# Patient Record
Sex: FEMALE | Race: ASIAN | NOT HISPANIC OR LATINO | Employment: FULL TIME | URBAN - METROPOLITAN AREA
[De-identification: names, ages, dates, MRNs, and addresses within clinical notes are randomized per-mention and may not be internally consistent; named-entity substitution may affect disease eponyms.]

---

## 2017-01-05 ENCOUNTER — OFFICE VISIT (OUTPATIENT)
Dept: PODIATRY | Facility: CLINIC | Age: 32
End: 2017-01-05
Payer: COMMERCIAL

## 2017-01-05 VITALS
HEART RATE: 76 BPM | HEIGHT: 64 IN | BODY MASS INDEX: 22.82 KG/M2 | WEIGHT: 133.69 LBS | SYSTOLIC BLOOD PRESSURE: 110 MMHG | DIASTOLIC BLOOD PRESSURE: 71 MMHG

## 2017-01-05 DIAGNOSIS — M20.10 HALLUX ABDUCTO VALGUS, UNSPECIFIED LATERALITY: ICD-10-CM

## 2017-01-05 DIAGNOSIS — M79.672 FOOT PAIN, LEFT: Primary | ICD-10-CM

## 2017-01-05 PROCEDURE — 99999 PR PBB SHADOW E&M-EST. PATIENT-LVL III: CPT | Mod: PBBFAC,,, | Performed by: PODIATRIST

## 2017-01-05 PROCEDURE — 99204 OFFICE O/P NEW MOD 45 MIN: CPT | Mod: S$GLB,,, | Performed by: PODIATRIST

## 2017-01-05 RX ORDER — DICLOFENAC SODIUM 10 MG/G
2 GEL TOPICAL 4 TIMES DAILY
Qty: 1 TUBE | Refills: 5 | Status: SHIPPED | OUTPATIENT
Start: 2017-01-05 | End: 2018-02-22 | Stop reason: CLARIF

## 2017-01-05 NOTE — MR AVS SNAPSHOT
"    [facility]iat[facility]  [address]  [place] [address]  Phone: [phone]                  Sveta Saez   2017 8:00 AM   Office Visit    Description:  Female : 1985   Provider:  Henry Jerez DPM   Department:  [facility]           Reason for Visit     Foot Problem     Foot Pain     Bunions           Diagnoses this Visit        Comments    Foot pain, left    -  Primary     Hallux abducto valgus, unspecified laterality                To Do List           Goals (5 Years of Data)     None      Follow-Up and Disposition     Return if symptoms worsen or fail to improve.      [facility]     [facility] Nurse Care Line -  Assistance  Registered nurses in the [facility] provide clinical advisement, health education, appointment booking, and other advisory services.  Call for this free service at [phone].             Medications                Verify that the below list of medications is an accurate representation of the medications you are currently taking.  If none reported, the list may be blank. If incorrect, please contact your healthcare provider. Carry this list with you in case of emergency.           Current Medications     butalbital-aspirin-caffeine (BUTALBITAL COMPOUND) -40 mg Tab Take 40 mg by mouth.    levocetirizine (XYZAL) 5 MG tablet Take 5 mg by mouth every evening.    meclizine (ANTIVERT) 32 MG tablet Take 25 mg by mouth 3 (three) times daily as needed.           Clinical Reference Information           Vital Signs - Last Recorded  Most recent update: 2017  8:20 AM by Shilpa Christine MA    BP Pulse Ht Wt BMI    110/71 76 5' 4" (1.626 m) 60.6 kg (133 lb 11.2 oz) 22.95 kg/m2      Blood Pressure          Most Recent Value    BP  110/71      Allergies as of 2017     No Known Allergies      Immunizations Administered on Date of Encounter - 2017     None      Instructions    Recommend wearing shoes with a wider toe box " during physical activity.    May apply a topical nsaid to the joint prior to and following exercise.    Recommend wearing a bunion shield to reduce pressure to the Lt. Bunion.    May ice the joint following activity as well to help with pain.

## 2017-01-05 NOTE — LETTER
January 5, 2017      Lee Akers MD  [address]  [place] [address]           [facility]  [address]  [place] [address]  Phone: [phone]          Patient: Sveta Saez   MR Number: [id number]   YOB: 1985   Date of Visit: 1/5/2017       Dear Dr. Lee Akers:    Thank you for referring Sveta Saez to me for evaluation. Attached you will find relevant portions of my assessment and plan of care.    If you have questions, please do not hesitate to call me. I look forward to following Sveta Saez along with you.    Sincerely,    Henry Jerez, AGGIE    Enclosure  CC:  No Recipients    If you would like to receive this communication electronically, please contact [email or web address] or [phone] to request more information on [employer] Link access.    For providers and/or their staff who would like to refer a patient to [facility], please contact us through our one-stop-shop provider referral line, [facility], at [phone].    If you feel you have received this communication in error or would no longer like to receive these types of communications, please e-mail [email or web address]

## 2017-01-05 NOTE — PATIENT INSTRUCTIONS
Recommend wearing shoes with a wider toe box during physical activity.    May apply a topical nsaid to the joint prior to and following exercise.    Recommend wearing a bunion shield to reduce pressure to the Lt. Bunion.    May ice the joint following activity as well to help with pain.

## 2017-01-05 NOTE — PROGRESS NOTES
Subjective:      Patient ID: Sveta Saez is a 31 y.o. female.    Chief Complaint: Foot Problem (PCP Dr. Akers 12/20/16); Foot Pain; and Bunions (left ft./ )  Patient presents to clinic with the complaint of pain in the Lt. Foot for the past several months.  Describes pain as aching and currently rates as a 6/10.  States symptoms are localized to the great toe joint.  Symptoms are usually exacerbated with running and other physical activity.  Symptoms are alleviated with rest and nonexistent with normal weightbearing.  Has not attmepted to self treat.  Denies any additional pedal complaints.        Past Medical History   Diagnosis Date    Abnormal Pap smear of cervix 2010       No past surgical history on file.    Family History   Problem Relation Age of Onset    Cancer Maternal Uncle      breast    Breast cancer Paternal Aunt     Cancer Paternal Uncle      breast    Thyroid disease Mother        Social History     Social History    Marital status: Single     Spouse name: N/A    Number of children: 0    Years of education: N/A     Occupational History    [facility]     prior      Social History Main Topics    Smoking status: Never Smoker    Smokeless tobacco: None    Alcohol use Yes    Drug use: No    Sexual activity: Yes     Partners: Male     Other Topics Concern    None     Social History Narrative       Current Outpatient Prescriptions   Medication Sig Dispense Refill    butalbital-aspirin-caffeine (BUTALBITAL COMPOUND) -40 mg Tab Take 40 mg by mouth.      levocetirizine (XYZAL) 5 MG tablet Take 5 mg by mouth every evening.      meclizine (ANTIVERT) 32 MG tablet Take 25 mg by mouth 3 (three) times daily as needed.      diclofenac sodium (VOLTAREN) 1 % Gel Apply 2 g topically 4 (four) times daily. 1 Tube 5     No current facility-administered medications for this visit.        Review of patient's allergies indicates:  No Known Allergies    Review of Systems   Constitution:  Negative for chills and fever.   Cardiovascular: Negative for claudication and leg swelling.   Skin: Negative for color change and nail changes.   Musculoskeletal: Positive for joint pain. Negative for arthritis and joint swelling.   Neurological: Negative for numbness and paresthesias.   Psychiatric/Behavioral: Negative for altered mental status.           Objective:      Physical Exam   Constitutional: She is oriented to person, place, and time. She appears well-developed and well-nourished. No distress.   Cardiovascular:   Pulses:       Dorsalis pedis pulses are 2+ on the right side, and 2+ on the left side.        Posterior tibial pulses are 2+ on the right side, and 2+ on the left side.   CFT <3 seconds bilateral.  Pedal hair growth present bilateral.   No varicosities noted bilateral.  No bilateral lower extremity edema.   Musculoskeletal: She exhibits no edema or tenderness.   Muscle strength 5/5 in all muscle groups bilateral.  No tenderness nor crepitation with ROM of foot/ankle joints bilateral.  No tenderness with palpation of bilateral foot and ankle.  Bilateral pes planus foot type.  Bilateral hallux abducto valgus.  Neither bunion appears to be trackbound.  No pain with palpation of bilateral medial eminence.      Neurological: She is alert and oriented to person, place, and time. She has normal strength. No sensory deficit.   Light touch intact bilateral.     Skin: Skin is warm, dry and intact. No abrasion, no bruising, no burn, no ecchymosis, no laceration, no lesion, no petechiae and no rash noted. She is not diaphoretic. No cyanosis or erythema. No pallor. Nails show no clubbing.   Pedal skin has normal turgor, temperature, and texture bilateral.  Toenails x 10 appear normotrophic. Examination of the skin reveals no evidence of significant maceration, rashes, open lesions, suspicious appearing nevi or other concerning lesions.              Assessment:       Encounter Diagnoses   Name Primary?     Foot pain, left Yes    Hallux abducto valgus, unspecified laterality          Plan:       Sveta was seen today for foot problem, foot pain and bunions.    Diagnoses and all orders for this visit:    Foot pain, left    Hallux abducto valgus, unspecified laterality      I counseled the patient on her conditions, their implications and medical management.    Recommend wearing shoes with a wider toe box during physical activity.    Recommend wearing a bunion shield to reduce pressure to the Lt. Bunion.    May ice the joint following activity to help with pain.    Prescription written for voltaren gel to be applied to the affected joint.     RTC prn.     Henry Jerez DPM

## 2017-01-23 ENCOUNTER — OFFICE VISIT (OUTPATIENT)
Dept: ORTHOPEDICS | Facility: CLINIC | Age: 32
End: 2017-01-23
Payer: COMMERCIAL

## 2017-01-23 ENCOUNTER — HOSPITAL ENCOUNTER (OUTPATIENT)
Dept: RADIOLOGY | Facility: HOSPITAL | Age: 32
Discharge: HOME | End: 2017-01-23
Attending: ORTHOPAEDIC SURGERY
Payer: COMMERCIAL

## 2017-01-23 VITALS
SYSTOLIC BLOOD PRESSURE: 125 MMHG | DIASTOLIC BLOOD PRESSURE: 84 MMHG | HEIGHT: 64 IN | BODY MASS INDEX: 22.1 KG/M2 | WEIGHT: 129.44 LBS | HEART RATE: 76 BPM

## 2017-01-23 DIAGNOSIS — M20.12 HALLUX VALGUS, LEFT: ICD-10-CM

## 2017-01-23 DIAGNOSIS — R52 PAIN: ICD-10-CM

## 2017-01-23 DIAGNOSIS — M77.42 METATARSALGIA OF LEFT FOOT: ICD-10-CM

## 2017-01-23 DIAGNOSIS — R52 PAIN: Primary | ICD-10-CM

## 2017-01-23 PROCEDURE — 99203 OFFICE O/P NEW LOW 30 MIN: CPT | Mod: S$GLB,,, | Performed by: ORTHOPAEDIC SURGERY

## 2017-01-23 PROCEDURE — 99999 PR PBB SHADOW E&M-EST. PATIENT-LVL III: CPT | Mod: PBBFAC,,, | Performed by: ORTHOPAEDIC SURGERY

## 2017-01-23 PROCEDURE — 73630 X-RAY EXAM OF FOOT: CPT | Mod: 26,LT,, | Performed by: RADIOLOGY

## 2017-01-23 PROCEDURE — 73630 X-RAY EXAM OF FOOT: CPT | Mod: TC,LT

## 2017-01-23 NOTE — PROGRESS NOTES
DATE: 1/23/2017  PATIENT: Sveta Saez    CHIEF COMPLAINT: left foot pain    HISTORY:  Sveta Saez is a 31 y.o. female here for initial evaluation of left foot pain.  She was at a wedding in 5/2016 and someone stepped on her left foot (she was wearing sandals).  She initially had bruising, which has resolved, and now continues to have pain.  The pain is in the first web space and worse with weight bearing/activities; it has caused her to stop running.  Pain is described as a burning.  She does have bilateral hallux valgus, but she has no pain along the medial 1st MTP joint.        PAST MEDICAL/SURGICAL HISTORY:  Past Medical History   Diagnosis Date    Abnormal Pap smear of cervix 2010     History reviewed. No pertinent past surgical history.    Current Medications:   Current Outpatient Prescriptions:     butalbital-aspirin-caffeine (BUTALBITAL COMPOUND) -40 mg Tab, Take 40 mg by mouth., Disp: , Rfl:     levocetirizine (XYZAL) 5 MG tablet, Take 5 mg by mouth every evening., Disp: , Rfl:     meclizine (ANTIVERT) 32 MG tablet, Take 25 mg by mouth 3 (three) times daily as needed., Disp: , Rfl:     diclofenac sodium (VOLTAREN) 1 % Gel, Apply 2 g topically 4 (four) times daily., Disp: 1 Tube, Rfl: 5    Social History:   Social History     Social History    Marital status: Single     Spouse name: N/A    Number of children: 0    Years of education: N/A     Occupational History    [facility]     prior      Social History Main Topics    Smoking status: Never Smoker    Smokeless tobacco: Not on file    Alcohol use Yes    Drug use: No    Sexual activity: Yes     Partners: Male     Other Topics Concern    Not on file     Social History Narrative       REVIEW OF SYSTEMS:  Constitution: Negative. Negative for chills, fever and night sweats.   Cardiovascular: Negative for chest pain and syncope.   Respiratory: Negative for cough and shortness of breath.   Gastrointestinal: See HPI. Negative for  "nausea/vomiting. Negative for abdominal pain.  Genitourinary: See HPI. Negative for discoloration or dysuria.  Skin: Negative for dry skin, itching and rash.   Hematologic/Lymphatic: Negative for bleeding problem. Does not bruise/bleed easily.   Musculoskeletal: Negative for falls and muscle weakness.   Neurological: See HPI. No seizures.   Endocrine: Negative for polydipsia, polyphagia and polyuria.   Allergic/Immunologic: Negative for hives and persistent infections.    PHYSICAL EXAMINATION:    Visit Vitals    /84    Pulse 76    Ht 5' 4" (1.626 m)    Wt 58.7 kg (129 lb 6.6 oz)    BMI 22.21 kg/m2       General: The patient is a 31 y.o. female in no apparent distress, the patient is orientatied to person, place and time.   Psych: Normal mood and affect  HEENT:  NCAT, sclera nonicteric  Lungs:  Respirations are equal and unlabored.  CV:  2+ bilateral upper and lower extremity pulses.  Skin:  Intact throughout.  Musculoskeletal: No pain with the range of motion of the bilateral hips. No trochanteric tenderness to palpation. No pain with range of motion about the bilateral knees.    Left Foot  - hallux valgus  - ttp to 1st web space  - no ttp over MTP joints or phalanges  - positive Jessi's sign to 1st web space  - NVI          IMAGING:     Radiographs of the left foot were ordered and personally reviewed with the patient today.   - congruent hallux valgus with increased LINDA    ASSESSMENT/PLAN:    Sveta was seen today for pain.    Diagnoses and all orders for this visit:    Pain, left foot  -     X-Ray Foot Complete Left; Future  -     MRI Lower Extremity Without Contrast Left; Future    Hallux valgus, left  -     MRI Lower Extremity Without Contrast Left; Future    Metatarsalgia of left foot  -     MRI Lower Extremity Without Contrast Left; Future      No Follow-up on file.    Possible neuroma, though this location of 1st web space is less common.  Will obtain MRI of left forefoot to evaluate for neuroma " vs other etiology of pain (such as cyst).  She will return to clinic after MRI to review results, if nothing is seen on MRI then we will consider a diagnostic injection.    I have personally taken the history and examined this patient and agree with the residents note as stated above.

## 2017-01-28 ENCOUNTER — HOSPITAL ENCOUNTER (OUTPATIENT)
Dept: RADIOLOGY | Facility: HOSPITAL | Age: 32
Discharge: HOME | End: 2017-01-28
Attending: ORTHOPAEDIC SURGERY
Payer: COMMERCIAL

## 2017-01-28 DIAGNOSIS — M77.42 METATARSALGIA OF LEFT FOOT: ICD-10-CM

## 2017-01-28 DIAGNOSIS — M20.12 HALLUX VALGUS, LEFT: ICD-10-CM

## 2017-01-28 DIAGNOSIS — R52 PAIN: ICD-10-CM

## 2017-01-28 PROCEDURE — 73718 MRI LOWER EXTREMITY W/O DYE: CPT | Mod: 26,LT,, | Performed by: RADIOLOGY

## 2017-01-28 PROCEDURE — 73718 MRI LOWER EXTREMITY W/O DYE: CPT | Mod: TC,LT

## 2017-02-20 ENCOUNTER — OFFICE VISIT (OUTPATIENT)
Dept: ORTHOPEDICS | Facility: CLINIC | Age: 32
End: 2017-02-20
Payer: COMMERCIAL

## 2017-02-20 VITALS
HEART RATE: 79 BPM | WEIGHT: 132.19 LBS | SYSTOLIC BLOOD PRESSURE: 109 MMHG | HEIGHT: 64 IN | BODY MASS INDEX: 22.57 KG/M2 | DIASTOLIC BLOOD PRESSURE: 71 MMHG

## 2017-02-20 DIAGNOSIS — M77.42 METATARSALGIA OF LEFT FOOT: ICD-10-CM

## 2017-02-20 DIAGNOSIS — R52 PAIN: ICD-10-CM

## 2017-02-20 DIAGNOSIS — M20.12 HALLUX VALGUS, LEFT: Primary | ICD-10-CM

## 2017-02-20 PROCEDURE — 99213 OFFICE O/P EST LOW 20 MIN: CPT | Mod: S$GLB,,, | Performed by: ORTHOPAEDIC SURGERY

## 2017-02-20 PROCEDURE — 99999 PR PBB SHADOW E&M-EST. PATIENT-LVL III: CPT | Mod: PBBFAC,,, | Performed by: ORTHOPAEDIC SURGERY

## 2017-02-20 NOTE — PROGRESS NOTES
Ms. Saez returns today with results of her MRI.  This is a 31-year-old female   who has a history of hallux valgus and sustained some trauma to her left foot   last May.  She states she had been stepped on by the sole of a male's shoe on   the dorsum of her foot in the first intermetatarsal space.  Since that time, she   has had pain with weightbearing activities as well as neurogenic type pain at   night.  When I examined her 10 days ago, she was noted to have the hallux valgus   deformity without any pain on range of motion of the big toe.  Her tenderness   was in the first intermetatarsal space.  Because of the chronicity of her   symptoms and history of trauma, I went ahead and ordered an MRI.  The MRI does   not show any evidence of any mass lesions.  There is some edema within the first   MTP joint around the lateral sesamoid bone.  There is no signal edema within   the bone that would suggest a fracture.  I suppose this could have something to   do with her symptoms, but as mentioned, she had hallux valgus prior to her   injury.  So at this point, I talked about possibly doing a diagnostic injection,   but she in fact might be considering having her hallux valgus deformity   addressed.  She does have a significant hallux valgus deformity with incongruent   joint and an increased first intermetatarsal angle.  Her symptoms are such that   she avoids high impact activity.  She states she can live with the symptoms.    So at this point, I would keep an eye on her.  I am having her make a return   appointment in three months for followup.      MIGUEL/IN  dd: 02/20/2017 13:54:08 (CST)  td: 02/21/2017 07:24:55 (CST)  Doc ID   #[id number]  Job ID #[id number]    CC:     This office note has been dictated.

## 2017-04-19 ENCOUNTER — OFFICE VISIT (OUTPATIENT)
Dept: DERMATOLOGY | Facility: CLINIC | Age: 32
End: 2017-04-19
Payer: COMMERCIAL

## 2017-04-19 DIAGNOSIS — L29.9 PRURITIC CONDITION: Primary | ICD-10-CM

## 2017-04-19 PROCEDURE — 99202 OFFICE O/P NEW SF 15 MIN: CPT | Mod: S$GLB,,, | Performed by: DERMATOLOGY

## 2017-04-19 PROCEDURE — 99999 PR PBB SHADOW E&M-EST. PATIENT-LVL II: CPT | Mod: PBBFAC,,, | Performed by: DERMATOLOGY

## 2017-04-19 RX ORDER — FLUOCINONIDE 0.5 MG/G
CREAM TOPICAL 2 TIMES DAILY
Qty: 60 G | Refills: 1 | Status: SHIPPED | OUTPATIENT
Start: 2017-04-19 | End: 2017-08-17 | Stop reason: SDUPTHER

## 2017-04-19 RX ORDER — HYDROXYZINE HYDROCHLORIDE 25 MG/1
25 TABLET, FILM COATED ORAL NIGHTLY
Qty: 30 TABLET | Refills: 1 | Status: SHIPPED | OUTPATIENT
Start: 2017-04-19 | End: 2017-08-17 | Stop reason: SDUPTHER

## 2017-04-19 NOTE — PROGRESS NOTES
Subjective:       Patient ID:  Sveta Saez is a 32 y.o. female who presents for   Chief Complaint   Patient presents with    Rash     back, x 6 months, itchy & bleeds w/trauma, OTC lotion & hydrocortisone     HPI  33 yo F presents for evaluation of a rash on her back x at least 6 months.  It is described as itchy, scratching to the point of bleeding at times.  Prior treatments include OTC lotions, hydrocortisone.  Denies personal or family h/o skin cancer    Past Medical History:   Diagnosis Date    Abnormal Pap smear of cervix 2010     Review of Systems   Skin: Positive for itching and rash. Negative for tendency to form keloidal scars.   Hematologic/Lymphatic: Does not bruise/bleed easily.        Objective:    Physical Exam   Constitutional: She appears well-developed and well-nourished.   Neurological: She is alert and oriented to person, place, and time.   Psychiatric: She has a normal mood and affect.   Skin:   Areas Examined (abnormalities noted in diagram):   Head / Face Inspection Performed  Neck Inspection Performed  Chest / Axilla Inspection Performed  Back Inspection Performed  RUE Inspected  LUE Inspection Performed              Diagram Legend     Erythematous scaling macule/papule c/w actinic keratosis       Vascular papule c/w angioma      Pigmented verrucoid papule/plaque c/w seborrheic keratosis      Yellow umbilicated papule c/w sebaceous hyperplasia      Irregularly shaped tan macule c/w lentigo     1-2 mm smooth white papules consistent with Milia      Movable subcutaneous cyst with punctum c/w epidermal inclusion cyst      Subcutaneous movable cyst c/w pilar cyst      Firm pink to brown papule c/w dermatofibroma      Pedunculated fleshy papule(s) c/w skin tag(s)      Evenly pigmented macule c/w junctional nevus     Mildly variegated pigmented, slightly irregular-bordered macule c/w mildly atypical nevus      Flesh colored to evenly pigmented papule c/w intradermal nevus       Pink pearly  papule/plaque c/w basal cell carcinoma      Erythematous hyperkeratotic cursted plaque c/w SCC      Surgical scar with no sign of skin cancer recurrence      Open and closed comedones      Inflammatory papules and pustules      Verrucoid papule consistent consistent with wart     Erythematous eczematous patches and plaques     Dystrophic onycholytic nail with subungual debris c/w onychomycosis     Umbilicated papule    Erythematous-base heme-crusted tan verrucoid plaque consistent with inflamed seborrheic keratosis     Erythematous Silvery Scaling Plaque c/w Psoriasis     See annotation      Assessment / Plan:        Pruritic condition-unsure of underlying etiology (possibly eczema) although today, her findings are more c/w macular amyloidosis  -     fluocinonide 0.05% (LIDEX) 0.05 % cream; Apply topically 2 (two) times daily. Upper back  Dispense: 60 g; Refill: 1  -     hydrOXYzine HCl (ATARAX) 25 MG tablet; Take 1 tablet (25 mg total) by mouth every evening.  Dispense: 30 tablet; Refill: 1  I discussed the side effects of topical steroids including atrophy, telangiectasias, striae.  Avoid use on the face and contact with the eyes  Discussed drowsiness as potential SE of hydroxyzine  Encouraged patient to stop scratching/rubbing as this can exacerbate the condition.  Cool compresses may help alleviate the itch sensation           Return in about 2 months (around 6/19/2017).

## 2017-06-27 ENCOUNTER — OFFICE VISIT (OUTPATIENT)
Dept: DERMATOLOGY | Facility: CLINIC | Age: 32
End: 2017-06-27
Payer: COMMERCIAL

## 2017-06-27 DIAGNOSIS — L29.9 PRURITIC CONDITION: Primary | ICD-10-CM

## 2017-06-27 PROCEDURE — 99213 OFFICE O/P EST LOW 20 MIN: CPT | Mod: S$GLB,,, | Performed by: DERMATOLOGY

## 2017-06-27 PROCEDURE — 99999 PR PBB SHADOW E&M-EST. PATIENT-LVL I: CPT | Mod: PBBFAC,,, | Performed by: DERMATOLOGY

## 2017-06-27 NOTE — PROGRESS NOTES
Subjective:       Patient ID:  Sveta Saez is a 32 y.o. female who presents for   Chief Complaint   Patient presents with    Follow-up     Rash, improving      Rash  - Follow-up  Diagnosis: macular amylodosis.  Symptom course: improving  Currently using: flucocinonide cream BID, hydroxyzine 25mg qhs.  Affected locations: back  Signs / symptoms: itching (Itching resolved with treatment. She tried to discontinue treatment and itching returned so she restarted 1 week ago. )  Severity: mild to moderate      Past Medical History:   Diagnosis Date    Abnormal Pap smear of cervix 2010     Review of Systems   Constitutional: Negative for fever, chills and fatigue.   Musculoskeletal: Negative for myalgias, joint swelling and arthralgias.   Skin: Positive for itching and rash.      Objective:    Physical Exam   Constitutional: She appears well-developed and well-nourished. No distress.   Neurological: She is alert and oriented to person, place, and time. She is not disoriented.   Psychiatric: She has a normal mood and affect.   Skin:   Areas Examined (abnormalities noted in diagram):   Head / Face Inspection Performed  Neck Inspection Performed  Chest / Axilla Inspection Performed  Back Inspection Performed              Diagram Legend     Erythematous scaling macule/papule c/w actinic keratosis       Vascular papule c/w angioma      Pigmented verrucoid papule/plaque c/w seborrheic keratosis      Yellow umbilicated papule c/w sebaceous hyperplasia      Irregularly shaped tan macule c/w lentigo     1-2 mm smooth white papules consistent with Milia      Movable subcutaneous cyst with punctum c/w epidermal inclusion cyst      Subcutaneous movable cyst c/w pilar cyst      Firm pink to brown papule c/w dermatofibroma      Pedunculated fleshy papule(s) c/w skin tag(s)      Evenly pigmented macule c/w junctional nevus     Mildly variegated pigmented, slightly irregular-bordered macule c/w mildly atypical nevus      Flesh colored  to evenly pigmented papule c/w intradermal nevus       Pink pearly papule/plaque c/w basal cell carcinoma      Erythematous hyperkeratotic cursted plaque c/w SCC      Surgical scar with no sign of skin cancer recurrence      Open and closed comedones      Inflammatory papules and pustules      Verrucoid papule consistent consistent with wart     Erythematous eczematous patches and plaques     Dystrophic onycholytic nail with subungual debris c/w onychomycosis     Umbilicated papule    Erythematous-base heme-crusted tan verrucoid plaque consistent with inflamed seborrheic keratosis     Erythematous Silvery Scaling Plaque c/w Psoriasis     See annotation      Assessment / Plan:      Macular cutaneous amyloidosis: upper back. Pruritus improves with topical Fluocinonide cream BID and hydroxyzine qhs.   - Continue fluocinonide cream BID prn flare  - Continue hydroxyzine at night. If 25mg tablet makes her too drowsy, recommend she cut in half. May also try Zyrtec in the morning.     RTC as needed

## 2017-08-17 DIAGNOSIS — L29.9 PRURITIC CONDITION: ICD-10-CM

## 2017-08-18 RX ORDER — FLUOCINONIDE 0.5 MG/G
CREAM TOPICAL 2 TIMES DAILY
Qty: 60 G | Refills: 0 | Status: SHIPPED | OUTPATIENT
Start: 2017-08-18 | End: 2018-02-22 | Stop reason: CLARIF

## 2017-08-18 RX ORDER — HYDROXYZINE HYDROCHLORIDE 25 MG/1
25 TABLET, FILM COATED ORAL NIGHTLY
Qty: 30 TABLET | Refills: 1 | Status: SHIPPED | OUTPATIENT
Start: 2017-08-18 | End: 2018-02-22 | Stop reason: CLARIF

## 2017-11-09 ENCOUNTER — PATIENT MESSAGE (OUTPATIENT)
Dept: OBSTETRICS AND GYNECOLOGY | Facility: CLINIC | Age: 32
End: 2017-11-09

## 2017-11-20 ENCOUNTER — OFFICE VISIT (OUTPATIENT)
Dept: OBSTETRICS AND GYNECOLOGY | Facility: CLINIC | Age: 32
End: 2017-11-20
Attending: OBSTETRICS & GYNECOLOGY
Payer: COMMERCIAL

## 2017-11-20 VITALS
DIASTOLIC BLOOD PRESSURE: 70 MMHG | SYSTOLIC BLOOD PRESSURE: 130 MMHG | WEIGHT: 136 LBS | BODY MASS INDEX: 24.1 KG/M2 | HEIGHT: 63 IN

## 2017-11-20 DIAGNOSIS — Z80.3 FAMILY HISTORY OF BREAST CANCER: ICD-10-CM

## 2017-11-20 DIAGNOSIS — Z01.419 WELL FEMALE EXAM WITH ROUTINE GYNECOLOGICAL EXAM: Primary | ICD-10-CM

## 2017-11-20 PROCEDURE — 99999 PR PBB SHADOW E&M-EST. PATIENT-LVL III: CPT | Mod: PBBFAC,,, | Performed by: OBSTETRICS & GYNECOLOGY

## 2017-11-20 PROCEDURE — 99395 PREV VISIT EST AGE 18-39: CPT | Mod: S$GLB,,, | Performed by: OBSTETRICS & GYNECOLOGY

## 2017-11-20 NOTE — PROGRESS NOTES
SUBJECTIVE:   32 y.o. female   for routine gyn exam. Patient's last menstrual period was 2017 (approximate)..  She has no unusual complaints.          Past Medical History:   Diagnosis Date    Abnormal Pap smear of cervix      Past Surgical History:   Procedure Laterality Date    NO PAST SURGERIES       Social History     Social History    Marital status: Single     Spouse name: N/A    Number of children: 0    Years of education: N/A     Occupational History    [employer]     prior      Social History Main Topics    Smoking status: Never Smoker    Smokeless tobacco: Never Used    Alcohol use Yes      Comment: social    Drug use: No    Sexual activity: Yes     Partners: Male     Birth control/ protection: None     Other Topics Concern    Not on file     Social History Narrative    No narrative on file     Family History   Problem Relation Age of Onset    Breast cancer Maternal Uncle     Breast cancer Paternal Aunt     Cancer Paternal Uncle     Thyroid disease Mother     Breast cancer Maternal Aunt 82    Melanoma Neg Hx     Psoriasis Neg Hx     Lupus Neg Hx     Colon cancer Neg Hx     Ovarian cancer Neg Hx      OB History    Para Term  AB Living   0 0 0 0 0 0   SAB TAB Ectopic Multiple Live Births   0 0 0 0                 Current Outpatient Prescriptions   Medication Sig Dispense Refill    butalbital-aspirin-caffeine (BUTALBITAL COMPOUND) -40 mg Tab Take 40 mg by mouth.      fluocinonide 0.05% (LIDEX) 0.05 % cream Apply topically 2 (two) times daily. Upper back 60 g 0    hydrOXYzine HCl (ATARAX) 25 MG tablet Take 1 tablet (25 mg total) by mouth every evening. 30 tablet 1    levocetirizine (XYZAL) 5 MG tablet Take 5 mg by mouth every evening.      meclizine (ANTIVERT) 32 MG tablet Take 25 mg by mouth 3 (three) times daily as needed.      diclofenac sodium (VOLTAREN) 1 % Gel Apply 2 g topically 4 (four) times daily. 1 Tube 5     No current  "facility-administered medications for this visit.      Allergies: Patient has no known allergies.     ROS:  Constitutional: no weight loss, weight gain, fever, fatigue  Eyes:  No vision changes, glasses/contacts  ENT/Mouth: No ulcers, sinus problems, ears ringing, headache  Cardiovascular: No inability to lie flat, chest pain, exercise intolerance, swelling, heart palpitations  Respiratory: No wheezing, coughing blood, shortness of breath, or cough  Gastrointestinal: No diarrhea, bloody stool, nausea/vomiting, constipation, gas, hemorrhoids  Genitourinary: No blood in urine, painful urination, urgency of urination, frequency of urination, incomplete emptying, incontinence, abnormal bleeding, painful periods, heavy periods, vaginal discharge, vaginal odor, painful intercourse, sexual problems, bleeding after intercourse.  Musculoskeletal: No muscle weakness  Skin/Breast: No painful breasts, nipple discharge, masses, rash, ulcers  Neurological: No passing out, seizures, numbness, headache  Endocrine: No diabetes, hypothyroid, hyperthyroid, hot flashes, hair loss, abnormal hair growth, ance  Psychiatric: No depression, crying  Hematologic: No bruises, bleeding, swollen lymph nodes, anemia.      OBJECTIVE:   The patient appears well, alert, oriented x 3, in no distress.  /70 (BP Location: Left arm, Patient Position: Sitting, BP Method: Medium (Manual))   Ht 5' 3" (1.6 m)   Wt 61.7 kg (136 lb 0.4 oz)   LMP 11/16/2017 (Approximate)   BMI 24.10 kg/m²   NECK: no thyromegaly, trachea midline  SKIN: no acne, striae, hirsutism  CHEST: CTAB  CV: RRR  BREAST EXAM: breasts appear normal, no suspicious masses, no skin or nipple changes or axillary nodes  ABDOMEN: no hernias, masses, or hepatosplenomegaly  GENITALIA: normal external genitalia, no erythema, no discharge  URETHRA: normal urethra, normal urethral meatus  VAGINA: Normal  CERVIX: no lesions or cervical motion tenderness  UTERUS: normal size, contour, position, " consistency, mobility, non-tender  ADNEXA: no mass, fullness, tenderness      ASSESSMENT:   1. Well female exam with routine gynecological exam     2. Family history of breast cancer         PLAN:       Discussed FH breast CA and BRCA gene mut testing for mother and uncle  Return to clinic in 1 year

## 2017-12-01 ENCOUNTER — HOSPITAL ENCOUNTER (OUTPATIENT)
Dept: RADIOLOGY | Facility: HOSPITAL | Age: 32
Discharge: HOME | End: 2017-12-01
Attending: FAMILY MEDICINE
Payer: COMMERCIAL

## 2017-12-01 ENCOUNTER — OFFICE VISIT (OUTPATIENT)
Dept: INTERNAL MEDICINE | Facility: CLINIC | Age: 32
End: 2017-12-01
Attending: FAMILY MEDICINE
Payer: COMMERCIAL

## 2017-12-01 VITALS
BODY MASS INDEX: 22.94 KG/M2 | DIASTOLIC BLOOD PRESSURE: 78 MMHG | HEIGHT: 64 IN | SYSTOLIC BLOOD PRESSURE: 123 MMHG | WEIGHT: 134.38 LBS

## 2017-12-01 DIAGNOSIS — M25.552 PAIN OF LEFT HIP JOINT: ICD-10-CM

## 2017-12-01 DIAGNOSIS — Z00.00 ANNUAL PHYSICAL EXAM: Primary | ICD-10-CM

## 2017-12-01 PROCEDURE — 73521 X-RAY EXAM HIPS BI 2 VIEWS: CPT | Mod: 26,,, | Performed by: RADIOLOGY

## 2017-12-01 PROCEDURE — 73521 X-RAY EXAM HIPS BI 2 VIEWS: CPT | Mod: TC

## 2017-12-01 PROCEDURE — 99395 PREV VISIT EST AGE 18-39: CPT | Mod: S$GLB,,, | Performed by: FAMILY MEDICINE

## 2017-12-01 PROCEDURE — 99999 PR PBB SHADOW E&M-EST. PATIENT-LVL III: CPT | Mod: PBBFAC,,, | Performed by: FAMILY MEDICINE

## 2017-12-01 NOTE — PROGRESS NOTES
Subjective:       Patient ID: Sveta Saez is a 32 y.o. female.    Chief Complaint: Annual Exam    Established patient for an annual wellness check/physical exam. No specific complaints, please see ROS.        Review of Systems   Constitutional: Negative for activity change, chills, fatigue, fever and unexpected weight change.   HENT: Negative for congestion, hearing loss, rhinorrhea and trouble swallowing.    Eyes: Negative for discharge, redness and visual disturbance.   Respiratory: Negative for cough, chest tightness, shortness of breath and wheezing.    Cardiovascular: Negative for chest pain, palpitations and leg swelling.   Gastrointestinal: Negative for abdominal pain, blood in stool, constipation, diarrhea and vomiting.   Endocrine: Negative for polydipsia and polyuria.   Genitourinary: Negative for difficulty urinating, dysuria, hematuria and menstrual problem.   Musculoskeletal: Positive for arthralgias. Negative for back pain, gait problem, joint swelling, myalgias and neck pain.   Skin: Negative for color change and rash.   Neurological: Negative for tremors, speech difficulty, weakness, numbness and headaches.   Hematological: Negative for adenopathy. Does not bruise/bleed easily.   Psychiatric/Behavioral: Negative for behavioral problems, confusion, dysphoric mood and sleep disturbance. The patient is not nervous/anxious.        Objective:      Physical Exam   Constitutional: She is oriented to person, place, and time. She appears well-developed and well-nourished. No distress.   Eyes: No scleral icterus.   Neck: Normal range of motion. Neck supple. No JVD present. No tracheal deviation present. No thyromegaly present.   Cardiovascular: Normal rate, normal heart sounds and intact distal pulses.  Exam reveals no gallop and no friction rub.    No murmur heard.  Pulmonary/Chest: Effort normal and breath sounds normal. No respiratory distress. She has no wheezes. She has no rales.   Abdominal: Soft. Bowel  sounds are normal. She exhibits no distension, no abdominal bruit and no mass. There is no tenderness. There is no rebound and no guarding.   Musculoskeletal: She exhibits no edema.        Right hip: She exhibits normal range of motion, normal strength and no tenderness.        Left hip: She exhibits tenderness and crepitus. She exhibits normal range of motion and normal strength.        Right lower leg: She exhibits no edema.        Left lower leg: She exhibits no edema.   Lymphadenopathy:     She has no cervical adenopathy.   Neurological: She is alert and oriented to person, place, and time. She displays no tremor. No cranial nerve deficit. Coordination and gait normal.   Skin: Skin is warm and dry. No rash noted. She is not diaphoretic. No erythema.   Psychiatric: She has a normal mood and affect. Her behavior is normal. Judgment and thought content normal.   Nursing note and vitals reviewed.      Assessment:       1. Annual physical exam    2. Pain of left hip joint        Plan:   Sveta was seen today for annual exam.    Diagnoses and all orders for this visit:    Annual physical exam  -     Basic metabolic panel; Future  -     Lipid panel; Future  -     TSH; Future    Pain of left hip joint  -     X-Ray Hips Bilateral 2 View Inc AP Pelvis; Future  -     X-Ray Arthrogram Hip Left with MRI to follow; Future  -     MRI Lower Ext Joint With Contrast Left; Future      See meds, orders, follow up, routing and instructions sections of encounter.  A  32-year-old established female patient for annual physical examination.  No   complaints.    The patient presents with left hip popping, not particularly painful.  We noted   this last year.  I asked her to watch it.  It has continued without change.  She   does note this more with extension.    On physical examination with internal rotation of the left hip, we did notice a   clunk.  This was slightly painful, a little bit uncharacteristic from her   typical  symptoms.    Hip x-rays were within normal limits.    RECOMMENDATION:  I suggested an MRI arthrogram of the left hip to rule out a   labral tear.  Her symptoms have not improved over the course of a year and a   have interfered with her confidence with exercising.  Further recommendations to   follow.      TIM/MANUEL  dd: 12/01/2017 18:19:16 (CST)  td: 12/02/2017 01:24:02 (CST)  Doc ID   #[id number]  Job ID #[id number]    CC:

## 2017-12-04 ENCOUNTER — LAB VISIT (OUTPATIENT)
Dept: LAB | Facility: HOSPITAL | Age: 32
End: 2017-12-04
Attending: FAMILY MEDICINE
Payer: COMMERCIAL

## 2017-12-04 DIAGNOSIS — Z00.00 ANNUAL PHYSICAL EXAM: ICD-10-CM

## 2017-12-04 LAB
ANION GAP SERPL CALC-SCNC: 6 MMOL/L
BUN SERPL-MCNC: 12 MG/DL
CALCIUM SERPL-MCNC: 8.5 MG/DL
CHLORIDE SERPL-SCNC: 108 MMOL/L
CHOLEST SERPL-MCNC: 166 MG/DL
CHOLEST/HDLC SERPL: 2.4 {RATIO}
CO2 SERPL-SCNC: 26 MMOL/L
CREAT SERPL-MCNC: 0.8 MG/DL
EST. GFR  (AFRICAN AMERICAN): >60 ML/MIN/1.73 M^2
EST. GFR  (NON AFRICAN AMERICAN): >60 ML/MIN/1.73 M^2
GLUCOSE SERPL-MCNC: 93 MG/DL
HDLC SERPL-MCNC: 69 MG/DL
HDLC SERPL: 41.6 %
LDLC SERPL CALC-MCNC: 85.6 MG/DL
NONHDLC SERPL-MCNC: 97 MG/DL
POTASSIUM SERPL-SCNC: 4 MMOL/L
SODIUM SERPL-SCNC: 140 MMOL/L
TRIGL SERPL-MCNC: 57 MG/DL
TSH SERPL DL<=0.005 MIU/L-ACNC: 0.87 UIU/ML

## 2017-12-04 PROCEDURE — 80048 BASIC METABOLIC PNL TOTAL CA: CPT

## 2017-12-04 PROCEDURE — 36415 COLL VENOUS BLD VENIPUNCTURE: CPT

## 2017-12-04 PROCEDURE — 80061 LIPID PANEL: CPT

## 2017-12-04 PROCEDURE — 84443 ASSAY THYROID STIM HORMONE: CPT

## 2017-12-08 ENCOUNTER — TELEPHONE (OUTPATIENT)
Dept: RADIOLOGY | Facility: HOSPITAL | Age: 32
End: 2017-12-08

## 2017-12-11 ENCOUNTER — HOSPITAL ENCOUNTER (OUTPATIENT)
Dept: RADIOLOGY | Facility: HOSPITAL | Age: 32
Discharge: HOME | End: 2017-12-11
Attending: FAMILY MEDICINE
Payer: COMMERCIAL

## 2017-12-11 DIAGNOSIS — M25.552 PAIN OF LEFT HIP JOINT: ICD-10-CM

## 2017-12-11 PROCEDURE — 73722 MRI JOINT OF LWR EXTR W/DYE: CPT | Mod: 26,LT,, | Performed by: RADIOLOGY

## 2017-12-11 PROCEDURE — 27093 INJECTION FOR HIP X-RAY: CPT | Mod: 26,,, | Performed by: RADIOLOGY

## 2017-12-11 PROCEDURE — 73525 CONTRAST X-RAY OF HIP: CPT | Mod: TC

## 2017-12-11 PROCEDURE — 73722 MRI JOINT OF LWR EXTR W/DYE: CPT | Mod: TC,LT

## 2017-12-11 PROCEDURE — 73525 CONTRAST X-RAY OF HIP: CPT | Mod: 26,,, | Performed by: RADIOLOGY

## 2017-12-11 PROCEDURE — 25500020 PHARM REV CODE 255: Performed by: FAMILY MEDICINE

## 2017-12-11 PROCEDURE — 25000003 PHARM REV CODE 250: Performed by: FAMILY MEDICINE

## 2017-12-11 PROCEDURE — 27093 INJECTION FOR HIP X-RAY: CPT | Mod: TC

## 2017-12-11 RX ORDER — BUPIVACAINE HYDROCHLORIDE 2.5 MG/ML
5 INJECTION, SOLUTION EPIDURAL; INFILTRATION; INTRACAUDAL ONCE
Status: COMPLETED | OUTPATIENT
Start: 2017-12-11 | End: 2017-12-11

## 2017-12-11 RX ORDER — GADOBUTROL 604.72 MG/ML
5 INJECTION INTRAVENOUS
Status: COMPLETED | OUTPATIENT
Start: 2017-12-11 | End: 2017-12-11

## 2017-12-11 RX ORDER — LIDOCAINE HYDROCHLORIDE 10 MG/ML
8 INJECTION, SOLUTION EPIDURAL; INFILTRATION; INTRACAUDAL; PERINEURAL ONCE
Status: COMPLETED | OUTPATIENT
Start: 2017-12-11 | End: 2017-12-11

## 2017-12-11 RX ADMIN — BUPIVACAINE HYDROCHLORIDE 12.5 MG: 2.5 INJECTION, SOLUTION EPIDURAL; INFILTRATION; INTRACAUDAL; PERINEURAL at 04:12

## 2017-12-11 RX ADMIN — IOHEXOL 6 ML: 300 INJECTION, SOLUTION INTRAVENOUS at 04:12

## 2017-12-11 RX ADMIN — LIDOCAINE HYDROCHLORIDE 80 MG: 10 INJECTION, SOLUTION EPIDURAL; INFILTRATION; INTRACAUDAL; PERINEURAL at 04:12

## 2017-12-11 RX ADMIN — GADOBUTROL 5 ML: 604.72 INJECTION INTRAVENOUS at 04:12

## 2017-12-14 ENCOUNTER — TELEPHONE (OUTPATIENT)
Dept: INTERNAL MEDICINE | Facility: CLINIC | Age: 32
End: 2017-12-14

## 2017-12-14 ENCOUNTER — PATIENT MESSAGE (OUTPATIENT)
Dept: INTERNAL MEDICINE | Facility: CLINIC | Age: 32
End: 2017-12-14

## 2017-12-14 DIAGNOSIS — S73.199A TEAR OF ACETABULAR LABRUM, UNSPECIFIED LATERALITY, INITIAL ENCOUNTER: Primary | ICD-10-CM

## 2017-12-14 NOTE — TELEPHONE ENCOUNTER
Called patient to schedule appt with Dr. Summers with [facility]. No answer. Left vm to return call.

## 2018-01-08 ENCOUNTER — HOSPITAL ENCOUNTER (OUTPATIENT)
Dept: RADIOLOGY | Facility: HOSPITAL | Age: 33
Discharge: HOME | End: 2018-01-08
Attending: ORTHOPAEDIC SURGERY
Payer: COMMERCIAL

## 2018-01-08 ENCOUNTER — OFFICE VISIT (OUTPATIENT)
Dept: SPORTS MEDICINE | Facility: CLINIC | Age: 33
End: 2018-01-08
Attending: FAMILY MEDICINE
Payer: COMMERCIAL

## 2018-01-08 VITALS
BODY MASS INDEX: 22.88 KG/M2 | WEIGHT: 134 LBS | DIASTOLIC BLOOD PRESSURE: 74 MMHG | HEART RATE: 67 BPM | SYSTOLIC BLOOD PRESSURE: 127 MMHG | HEIGHT: 64 IN

## 2018-01-08 DIAGNOSIS — S73.192A TEAR OF LEFT ACETABULAR LABRUM, INITIAL ENCOUNTER: ICD-10-CM

## 2018-01-08 DIAGNOSIS — M25.552 LEFT HIP PAIN: Primary | ICD-10-CM

## 2018-01-08 DIAGNOSIS — M25.552 LEFT HIP PAIN: ICD-10-CM

## 2018-01-08 PROCEDURE — 73503 X-RAY EXAM HIP UNI 4/> VIEWS: CPT | Mod: TC,PO,LT

## 2018-01-08 PROCEDURE — 73503 X-RAY EXAM HIP UNI 4/> VIEWS: CPT | Mod: 26,LT,, | Performed by: RADIOLOGY

## 2018-01-08 PROCEDURE — 99999 PR PBB SHADOW E&M-EST. PATIENT-LVL III: CPT | Mod: PBBFAC,,, | Performed by: ORTHOPAEDIC SURGERY

## 2018-01-08 PROCEDURE — 99204 OFFICE O/P NEW MOD 45 MIN: CPT | Mod: S$GLB,,, | Performed by: ORTHOPAEDIC SURGERY

## 2018-01-08 NOTE — PROGRESS NOTES
CC:left hip pain    HPI:   Sveta Saez is a pleasant 32 y.o. [employer] patient who reports to clinic with left hip pain. Today the patient rates pain at a 7/10 on visual analog scale.       Started without inciting event.  Has catching, clicking in left hip.       3-4 years duration, neg traumatic injury,     no instabilty.  Getting in and out of car + pain    Affecting ADLs and exercising    running activity worst    Has done 6 weeks NSAIDs, no help.    Has done exercises, no help.     Review of Systems   Constitution: Negative. Negative for chills, fever and night sweats.   HENT: Negative for congestion and headaches.    Eyes: Negative for blurred vision, left vision loss and right vision loss.   Cardiovascular: Negative for chest pain and syncope.   Respiratory: Negative for cough and shortness of breath.    Endocrine: Negative for polydipsia, polyphagia and polyuria.   Hematologic/Lymphatic: Negative for bleeding problem. Does not bruise/bleed easily.   Skin: Negative for dry skin, itching and rash.   Musculoskeletal: Negative for falls and muscle weakness.   Gastrointestinal: Negative for abdominal pain and bowel incontinence.   Genitourinary: Negative for bladder incontinence and nocturia.   Neurological: Negative for disturbances in coordination, loss of balance and seizures.   Psychiatric/Behavioral: Negative for depression. The patient does not have insomnia.    Allergic/Immunologic: Negative for hives and persistent infections.     PAST MEDICAL HISTORY:   Past Medical History:   Diagnosis Date    Abnormal Pap smear of cervix 2010     PAST SURGICAL HISTORY:   Past Surgical History:   Procedure Laterality Date    NO PAST SURGERIES       FAMILY HISTORY:   Family History   Problem Relation Age of Onset    Breast cancer Maternal Uncle     Breast cancer Paternal Aunt     Cancer Paternal Uncle     Thyroid disease Mother     Breast cancer Maternal Aunt 82    Melanoma Neg Hx     Psoriasis Neg Hx      "Lupus Neg Hx     Colon cancer Neg Hx     Ovarian cancer Neg Hx      SOCIAL HISTORY:   Social History     Social History    Marital status: Single     Spouse name: N/A    Number of children: 0    Years of education: N/A     Occupational History    [employer]     prior      Social History Main Topics    Smoking status: Never Smoker    Smokeless tobacco: Never Used    Alcohol use Yes      Comment: social    Drug use: No    Sexual activity: Yes     Partners: Male     Birth control/ protection: None     Other Topics Concern    Not on file     Social History Narrative    No narrative on file       MEDICATIONS:   Current Outpatient Prescriptions:     butalbital-aspirin-caffeine (BUTALBITAL COMPOUND) -40 mg Tab, Take 40 mg by mouth., Disp: , Rfl:     fluocinonide 0.05% (LIDEX) 0.05 % cream, Apply topically 2 (two) times daily. Upper back, Disp: 60 g, Rfl: 0    hydrOXYzine HCl (ATARAX) 25 MG tablet, Take 1 tablet (25 mg total) by mouth every evening., Disp: 30 tablet, Rfl: 1    levocetirizine (XYZAL) 5 MG tablet, Take 5 mg by mouth every evening., Disp: , Rfl:     meclizine (ANTIVERT) 32 MG tablet, Take 25 mg by mouth 3 (three) times daily as needed., Disp: , Rfl:     diclofenac sodium (VOLTAREN) 1 % Gel, Apply 2 g topically 4 (four) times daily., Disp: 1 Tube, Rfl: 5  ALLERGIES: Review of patient's allergies indicates:  No Known Allergies    VITAL SIGNS: /74   Pulse 67   Ht 5' 4" (1.626 m)   Wt 60.8 kg (134 lb)   BMI 23.00 kg/m²        PHYSICAL EXAM / HIP  PHYSICAL EXAMINATION  General:  The patient is alert and oriented x 3.  Mood is pleasant.  Observation of ears, eyes and nose reveal no gross abnormalities.  HEENT: NCAT, sclera nonicteric  Lungs: Respirations are equal and unlabored.    left HIP EXAMINATION     OBSERVATION / INSPECTION  Gait:   Nonantalgic   Alignment:  Neutral   Scars:   None   Muscle atrophy: None   Effusion:  None   Warmth:  None   Discoloration:   None "   Leg lengths:   Equal   Pelvis:   Level     TENDERNESS / CREPITUS (T/C):      T / C  Trochanteric bursa   + / -  Piriformis    - / -  SI joint    - / -  Psoas tendon   - / -  Rectus insertion  - / -  Adductor insertion  - / -  Pubic symphysis  - / -    ROM: (* = pain)    Flexion:    120 degrees  External rotation: 40 degrees  Internal rotation with axial load: 20 degrees  Internal rotation without axial load: 30 degrees  Abduction:  45 degrees  Adduction:   20 degrees    SPECIAL TESTS:  Pain w/ forced internal rotation (FADIR): +   Pain w/ forced external rotation (JOYCELYN): Absent   JOYCELYN asymmetry:     +  Circumduction test:    +  Stinchfield test:    Negative   Log roll:      Negative   Snapping hip (internal):   Negative   Sit-up pain:     Negative   Resisted sit-up pain:    Negative   Resisted sit-up w/ adductor contraction pain:Negative   Step-down test:      Trendelenburg test:    Negative   Bridge test     +    EXTREMITY NEURO-VASCULAR EXAMINATION:   Sensation:  Grossly intact to light touch all dermatomal regions.   Motor Function:  Fully intact motor function at hip, knee, foot and ankle    DTRs;  quadriceps and  achilles 2+.  No clonus and downgoing Babinski.    Vascular status:  DP and PT pulses 2+, brisk capillary refill, symmetric.    Skin: intact, compartments soft.    OTHER FINDINGS:      XRAYS:  CE angle: 35   Crossover: neg   CAM:  +  Joint space narrowing: neg    MRI:  Labral tear:  + anterior superior tear  CAM:  +    A/P  left hip labral tear, chondrolabral dysfunction    Anesthetic & cortisone injection to left hip with Dr. Alcon Rondon  Call me day after injection with diagnostic relief %    Non-op vs. Operative intervention risks and benefits explained in detail  All questions were answered, pt will contact us for questions or concerns in the interim.

## 2018-02-01 ENCOUNTER — HOSPITAL ENCOUNTER (OUTPATIENT)
Facility: OTHER | Age: 33
Discharge: HOME | End: 2018-02-01
Attending: ANESTHESIOLOGY | Admitting: ANESTHESIOLOGY
Payer: COMMERCIAL

## 2018-02-01 ENCOUNTER — SURGERY (OUTPATIENT)
Age: 33
End: 2018-02-01

## 2018-02-01 VITALS
SYSTOLIC BLOOD PRESSURE: 126 MMHG | DIASTOLIC BLOOD PRESSURE: 74 MMHG | BODY MASS INDEX: 22.2 KG/M2 | HEART RATE: 71 BPM | TEMPERATURE: 99 F | RESPIRATION RATE: 18 BRPM | OXYGEN SATURATION: 99 % | WEIGHT: 130 LBS | HEIGHT: 64 IN

## 2018-02-01 DIAGNOSIS — R52 PAIN: ICD-10-CM

## 2018-02-01 DIAGNOSIS — S73.192A TEAR OF LEFT ACETABULAR LABRUM, INITIAL ENCOUNTER: Primary | ICD-10-CM

## 2018-02-01 PROCEDURE — 77002 NEEDLE LOCALIZATION BY XRAY: CPT | Mod: 26,,, | Performed by: ANESTHESIOLOGY

## 2018-02-01 PROCEDURE — 77002 NEEDLE LOCALIZATION BY XRAY: CPT | Performed by: ANESTHESIOLOGY

## 2018-02-01 PROCEDURE — 25000003 PHARM REV CODE 250: Performed by: ANESTHESIOLOGY

## 2018-02-01 PROCEDURE — 20610 DRAIN/INJ JOINT/BURSA W/O US: CPT | Performed by: ANESTHESIOLOGY

## 2018-02-01 PROCEDURE — 20610 DRAIN/INJ JOINT/BURSA W/O US: CPT | Mod: LT,,, | Performed by: ANESTHESIOLOGY

## 2018-02-01 PROCEDURE — 25500020 PHARM REV CODE 255: Performed by: ANESTHESIOLOGY

## 2018-02-01 PROCEDURE — 63600175 PHARM REV CODE 636 W HCPCS: Performed by: ANESTHESIOLOGY

## 2018-02-01 RX ORDER — SODIUM CHLORIDE 9 MG/ML
500 INJECTION, SOLUTION INTRAVENOUS CONTINUOUS
Status: DISCONTINUED | OUTPATIENT
Start: 2018-02-01 | End: 2018-02-01 | Stop reason: HOSPADM

## 2018-02-01 RX ORDER — ALPRAZOLAM 0.5 MG/1
1 TABLET, ORALLY DISINTEGRATING ORAL ONCE
Status: COMPLETED | OUTPATIENT
Start: 2018-02-01 | End: 2018-02-01

## 2018-02-01 RX ORDER — LIDOCAINE HYDROCHLORIDE 10 MG/ML
INJECTION INFILTRATION; PERINEURAL
Status: DISCONTINUED | OUTPATIENT
Start: 2018-02-01 | End: 2018-02-01 | Stop reason: HOSPADM

## 2018-02-01 RX ORDER — TRIAMCINOLONE ACETONIDE 40 MG/ML
INJECTION, SUSPENSION INTRA-ARTICULAR; INTRAMUSCULAR
Status: DISCONTINUED | OUTPATIENT
Start: 2018-02-01 | End: 2018-02-01 | Stop reason: HOSPADM

## 2018-02-01 RX ORDER — BUPIVACAINE HYDROCHLORIDE 2.5 MG/ML
INJECTION, SOLUTION EPIDURAL; INFILTRATION; INTRACAUDAL
Status: DISCONTINUED | OUTPATIENT
Start: 2018-02-01 | End: 2018-02-01 | Stop reason: HOSPADM

## 2018-02-01 RX ADMIN — LIDOCAINE HYDROCHLORIDE 10 ML: 10 INJECTION, SOLUTION INFILTRATION; PERINEURAL at 01:02

## 2018-02-01 RX ADMIN — BUPIVACAINE HYDROCHLORIDE 10 ML: 2.5 INJECTION, SOLUTION EPIDURAL; INFILTRATION; INTRACAUDAL; PERINEURAL at 01:02

## 2018-02-01 RX ADMIN — TRIAMCINOLONE ACETONIDE 40 MG: 40 INJECTION, SUSPENSION INTRA-ARTICULAR; INTRAMUSCULAR at 01:02

## 2018-02-01 RX ADMIN — IOHEXOL 5 ML: 300 INJECTION, SOLUTION INTRAVENOUS at 01:02

## 2018-02-01 RX ADMIN — ALPRAZOLAM 1 MG: 0.5 TABLET, ORALLY DISINTEGRATING ORAL at 12:02

## 2018-02-01 NOTE — OP NOTE
27- Hip Injection/Arthrogram  ANTERIOR APPROACH  Time-out taken to identify patient and procedure side prior to starting the procedure.   I attest that I have reviewed the patient's home medications prior to the procedure and no contraindication have been identified. I  re-evaluated the patient after the patient was positioned for the procedure in the procedure room immediately before the procedural time-out. The vital signs are current and represent the current state of the patient which has not significantly changed since the preprocedure assessment.                                                                                                                         Date of Service: 02/01/2018    PCP: Lee Cowan MD    Referring Physician:                                                                                             PROCEDURE:  Left hip joint injection under fluoroscopy.    REASON FOR PROCEDURE: Tear of left acetabular labrum, initial encounter [S73.192A]     1. Tear of left acetabular labrum, initial encounter    2. Pain        PHYSICIAN: Phani Rondon MD  ASSISTANTS: Luis Sethi MD                                                                                              ANESTHESIA:  Xylocaine 1% 9ml with Sodium Bicarbonate 1ml. 3ml per site.                                                                                                              MEDICATIONS INJECTED:  Kenalog 20mg, bupivacaine 0.25% 2 mL (per side), and sterile saline 2ml (per side)                                                                                                                                     ESTIMATED BLOOD LOSS:  None.                                                                                                                              COMPLICATIONS:  None.     SEDATION: None                                                                                                                                     TECHNIQUE:  With the patient lying in the supine position the the femoral neck identified under fluoroscopy.  The area was prepped and draped in the usual       sterile fashion.  Local anesthetic was used, given by raising a wheal and    going down to the hub of the 27-gauge needle.  A 3-1/2 inch 22-gauge         needle was introduced under fluoroscopy until the tip reached the lateral aspect of the femoral neck.  When the tip of the needle was thought   to be in appropriate position, contrast was injected to confirm proper placement.  Medication was then injected slowly.  The patient tolerated the procedure well.                                                                                                                     PAIN BEFORE THE PROCEDURE: 6/10.                                                                                                                         PAIN AFTER THE PROCEDURE:  0/10.                                                                                                                          The patient was monitored for 20-30 minutes after the procedure and was      given post procedure and discharge instructions to follow at home.  The      patient is to follow-up with me in two weeks by calling.   The patient was discharged in a stable condtion.

## 2018-02-01 NOTE — DISCHARGE INSTRUCTIONS
Home Care Instructions Pain Management:    1. DIET:   You may resume your normal diet today.   2. BATHING:   You may shower with luke warm water.  3. DRESSING:   You may remove your bandage today.   4. ACTIVITY LEVEL:   You may resume your normal activities 24 hrs after your procedure.  5. MEDICATIONS:   You may resume your normal medications today.   6. SPECIAL INSTRUCTIONS:   No heat to the injection site for 24 hrs including, bath or shower, heating pad, moist heat, or hot tubs.    Use ice pack to injection site for any pain or discomfort.  Apply ice packs for 20 minute intervals as needed.   If you have received any sedatives by mouth today you may not drive for 12 hours.    If you have received any sedation through your IV, you may not drive for 24 hrs.     PLEASE CALL YOUR DOCTOR IF:  1. Redness or swelling around the injection site.  2. Fever of 101 degrees  3. Drainage (pus) from the injection site.  4. For any continuous bleeding (some dried blood over the incision is normal.)    FOR EMERGENCIES:   If any unusual problems or difficulties occur during clinic hours, call [phone] or [phone].     Thank you for allowing us to care for you today. You may receive a survey about the care we provided. Your feedback is valuable and helps us provide excellent care throughout the community.

## 2018-02-02 ENCOUNTER — TELEPHONE (OUTPATIENT)
Dept: SPORTS MEDICINE | Facility: CLINIC | Age: 33
End: 2018-02-02

## 2018-02-02 NOTE — TELEPHONE ENCOUNTER
----- Message from Lindsay Manley sent at 2/2/2018  2:09 PM CST -----  Contact: self   Pt is requesting a call back regarding the injection she just received. Pt can be reached at [phone]

## 2018-02-05 ENCOUNTER — TELEPHONE (OUTPATIENT)
Dept: SPORTS MEDICINE | Facility: CLINIC | Age: 33
End: 2018-02-05

## 2018-02-05 NOTE — TELEPHONE ENCOUNTER
ASSESSMENT:    Left Hip labral tear.  I have reviewed the MRI .she  has tearing in the labrum.     she would benefit from hip arthroscopy, given the above.     PLAN: We have discussed the surgery and recovery of arthroscopic hip surgery. she understands that there may be limited weightbearing up to several weeks (usually 3 weeks) after surgery depending on procedures that are performed at the time of surgery.    The spectrum of treatment options were discussed with the patient, including nonoperative and operative options.  After thorough discussion, the patient has elected to undergo surgical treatment to include:    left   a. Hip arthroscopic labral repair versus debridement   b. Hip arthroscopic femoral neck osteoplasty   c. Hip arthroscopic acetabuloplasty   d. Hip arthroscopic partial synovectomy/debridement   e. Hip arthroscopic capsular closure   f.  Hip arthroscopic-assisted Bio-D arthrocentesis       The details of the surgical procedure were explained, including the location of probable incisions and a description of likely hardware and/or grafts to be used.  The patient understands the likely convalescence after surgery.  Also, we have thoroughly discussed the risks, benefits and alternatives to surgery, including, but not limited to, the risk of infection, joint stiffness, skin injury to perineum, heterotopic ossification, arthritis, blood clot (including DVT and/or pulmonary embolus), neurologic and vascular injury.  It was explained that, if tissue has been repaired or reconstructed, there is a chance of failure, which may require further management.      Heterotopic ossification is a known complication of hip arthroscopy and Naprosyn significantly decreases this risk.  According to Deepti in the Journal of Orthopedic Traumatology 2010, the rate of heterotopic ossification for cases with Naprosyn for prophylaxis was 0% and without Naprosyn for prophylaxis was 33%.  Lorie at the International  Society of Hip Arthroscopy Annual Meeting in 2012 showed similar results with an incidence of heterotopic ossification in patients that did receive Naprosyn prophylaxis was 4.5% versus versus 23.6% in patients who did not receive Naproxen twice a day for three weeks.  Valentine's study in the American Journal of Sports Medicine in 2012 showed the addition of Indocin to the medication protocol (in addition to Naprosyn) decreased the rate of heterotopic ossification from 8.3% to 1.8%.     Patient will call with dates and schedule pre op visit.

## 2018-02-08 ENCOUNTER — TELEPHONE (OUTPATIENT)
Dept: SPORTS MEDICINE | Facility: CLINIC | Age: 33
End: 2018-02-08

## 2018-02-08 NOTE — TELEPHONE ENCOUNTER
----- Message from Sandra Lundy sent at 2/8/2018 12:47 PM CST -----  Contact: self  Pt would like to reschedule her surgery that is schedule for 03/01 and would like it on 03/13 if possible. Pt would like a call back from Gemini or Shreya in regards to this matter and could be reached at [phone]

## 2018-02-08 NOTE — TELEPHONE ENCOUNTER
----- Message from Gemini Holloway MA sent at 2/6/2018  4:32 PM CST -----  Contact: self   Please see below    Gemini Holloway   Clinical assistant to Dr. Marivel Summers    ----- Message -----  From: Sandra Lundy  Sent: 2/6/2018   3:41 PM  To: [facility] Staff    Pt called stating that she is ready to schedule her surgery with  for her left hip. Pt could be reached at [phone]

## 2018-02-14 ENCOUNTER — TELEPHONE (OUTPATIENT)
Dept: SPORTS MEDICINE | Facility: CLINIC | Age: 33
End: 2018-02-14

## 2018-02-20 DIAGNOSIS — M25.9: Primary | ICD-10-CM

## 2018-02-20 DIAGNOSIS — S76.019D STRAIN OF HIP AND THIGH, UNSPECIFIED LATERALITY, SUBSEQUENT ENCOUNTER: ICD-10-CM

## 2018-02-20 DIAGNOSIS — S76.919D STRAIN OF HIP AND THIGH, UNSPECIFIED LATERALITY, SUBSEQUENT ENCOUNTER: ICD-10-CM

## 2018-02-20 DIAGNOSIS — S73.199D TEAR OF ACETABULAR LABRUM, UNSPECIFIED LATERALITY, SUBSEQUENT ENCOUNTER: ICD-10-CM

## 2018-02-22 ENCOUNTER — HOSPITAL ENCOUNTER (OUTPATIENT)
Dept: PREADMISSION TESTING | Facility: OTHER | Age: 33
Discharge: HOME | End: 2018-02-22
Attending: ORTHOPAEDIC SURGERY
Payer: COMMERCIAL

## 2018-02-22 ENCOUNTER — ANESTHESIA EVENT (OUTPATIENT)
Dept: SURGERY | Facility: OTHER | Age: 33
End: 2018-02-22
Payer: COMMERCIAL

## 2018-02-22 ENCOUNTER — OFFICE VISIT (OUTPATIENT)
Dept: SPORTS MEDICINE | Facility: CLINIC | Age: 33
End: 2018-02-22
Payer: COMMERCIAL

## 2018-02-22 VITALS
BODY MASS INDEX: 22.2 KG/M2 | HEIGHT: 64 IN | SYSTOLIC BLOOD PRESSURE: 113 MMHG | HEART RATE: 66 BPM | WEIGHT: 130 LBS | DIASTOLIC BLOOD PRESSURE: 74 MMHG

## 2018-02-22 VITALS
HEART RATE: 66 BPM | DIASTOLIC BLOOD PRESSURE: 68 MMHG | SYSTOLIC BLOOD PRESSURE: 119 MMHG | WEIGHT: 130 LBS | BODY MASS INDEX: 22.2 KG/M2 | HEIGHT: 64 IN | OXYGEN SATURATION: 100 % | RESPIRATION RATE: 16 BRPM | TEMPERATURE: 99 F

## 2018-02-22 DIAGNOSIS — S73.192D TEAR OF LEFT ACETABULAR LABRUM, SUBSEQUENT ENCOUNTER: Primary | ICD-10-CM

## 2018-02-22 DIAGNOSIS — S73.102D HIP SPRAIN, LEFT, SUBSEQUENT ENCOUNTER: ICD-10-CM

## 2018-02-22 PROCEDURE — 99999 PR PBB SHADOW E&M-EST. PATIENT-LVL III: CPT | Mod: PBBFAC,,, | Performed by: PHYSICIAN ASSISTANT

## 2018-02-22 PROCEDURE — 99499 UNLISTED E&M SERVICE: CPT | Mod: S$GLB,,, | Performed by: PHYSICIAN ASSISTANT

## 2018-02-22 RX ORDER — MUPIROCIN 20 MG/G
OINTMENT TOPICAL
Status: CANCELLED | OUTPATIENT
Start: 2018-02-22

## 2018-02-22 RX ORDER — SODIUM CHLORIDE, SODIUM LACTATE, POTASSIUM CHLORIDE, CALCIUM CHLORIDE 600; 310; 30; 20 MG/100ML; MG/100ML; MG/100ML; MG/100ML
INJECTION, SOLUTION INTRAVENOUS CONTINUOUS
Status: CANCELLED | OUTPATIENT
Start: 2018-02-22

## 2018-02-22 RX ORDER — NAPROXEN 500 MG/1
500 TABLET ORAL 2 TIMES DAILY WITH MEALS
Qty: 42 TABLET | Refills: 0 | Status: SHIPPED | OUTPATIENT
Start: 2018-02-22 | End: 2018-03-15

## 2018-02-22 RX ORDER — MIDAZOLAM HYDROCHLORIDE 5 MG/ML
2 INJECTION INTRAMUSCULAR; INTRAVENOUS
Status: CANCELLED | OUTPATIENT
Start: 2018-02-22 | End: 2018-02-22

## 2018-02-22 RX ORDER — PROMETHAZINE HYDROCHLORIDE 25 MG/1
25 TABLET ORAL EVERY 6 HOURS PRN
Qty: 40 TABLET | Refills: 0 | Status: SHIPPED | OUTPATIENT
Start: 2018-02-22 | End: 2018-08-20

## 2018-02-22 RX ORDER — INDOMETHACIN 75 MG/1
75 CAPSULE, EXTENDED RELEASE ORAL
Qty: 4 CAPSULE | Refills: 0 | Status: SHIPPED | OUTPATIENT
Start: 2018-02-22 | End: 2018-02-26

## 2018-02-22 RX ORDER — LIDOCAINE HYDROCHLORIDE 10 MG/ML
0.5 INJECTION, SOLUTION EPIDURAL; INFILTRATION; INTRACAUDAL; PERINEURAL ONCE
Status: CANCELLED | OUTPATIENT
Start: 2018-02-22 | End: 2018-02-22

## 2018-02-22 RX ORDER — FAMOTIDINE 20 MG/1
20 TABLET, FILM COATED ORAL
Status: CANCELLED | OUTPATIENT
Start: 2018-02-22 | End: 2018-02-22

## 2018-02-22 RX ORDER — OXYCODONE AND ACETAMINOPHEN 10; 325 MG/1; MG/1
1 TABLET ORAL
Qty: 40 TABLET | Refills: 0 | Status: SHIPPED | OUTPATIENT
Start: 2018-02-22 | End: 2018-08-20

## 2018-02-22 RX ORDER — OXYCODONE HCL 10 MG/1
10 TABLET, FILM COATED, EXTENDED RELEASE ORAL
Status: CANCELLED | OUTPATIENT
Start: 2018-02-22 | End: 2018-02-22

## 2018-02-22 RX ORDER — TRAMADOL HYDROCHLORIDE 50 MG/1
50-100 TABLET ORAL EVERY 6 HOURS PRN
Qty: 40 TABLET | Refills: 0 | Status: SHIPPED | OUTPATIENT
Start: 2018-02-22 | End: 2018-12-18 | Stop reason: ALTCHOICE

## 2018-02-22 RX ORDER — PREGABALIN 75 MG/1
150 CAPSULE ORAL
Status: DISCONTINUED | OUTPATIENT
Start: 2018-03-13 | End: 2018-02-23 | Stop reason: HOSPADM

## 2018-02-22 RX ORDER — OMEPRAZOLE 40 MG/1
40 CAPSULE, DELAYED RELEASE ORAL DAILY
Qty: 25 CAPSULE | Refills: 0 | Status: SHIPPED | OUTPATIENT
Start: 2018-02-22 | End: 2018-03-19

## 2018-02-22 NOTE — H&P
Sveta Saez  is here for a completion of her perioperative paperwork. she  Is scheduled to undergo left hip labral repair vs. synovectomy on 3/13/18.  She is a healthy individual and does not need clearance for this procedure.     Risks, indications and benefits of the surgical procedure were discussed with the patient. All questions with regard to surgery, rehab, expected return to functional activities, activities of daily living and recreational endeavors were answered to her satisfaction.    Once no other questions were asked, a brief history and physical exam was then performed.      PHYSICAL EXAM:  GEN: A&Ox3, WD WN NAD  HEENT: WNL  CHEST: CTAB, no W/R/R  HEART: RRR, no M/R/G  ABD: Soft, NT ND, BS x4 QUADS  MS; See Epic  NEURO: CN II-XII intact       The surgical consent was then reviewed with the patient, who agreed with all the contents of the consent form and it was signed. she was then given the [facility] surgery packet to bring with her to [facility] for the anesthesia portion of her perioperative paperwork.     PHYSICAL THERAPY:  She was also instructed regarding physical therapy and will begin on  POD#1. She was given a copy of the original prescription to schedule. Another copy of this prescription was also faxed to [facility].    POST OP CARE:instructions were reviewed including care of the wound and dressing after surgery and when she can shower.     PAIN MANAGEMENT: Sveta Saez was also given her pain management regime, which includes the TENS unit given to her by Alicia along with the education required for its use. She was also instructed regarding the Polar ice unit that will be in place after surgery and her postoperative pain medications.     MEDICATION:  PAIN MEDICATION:    Percocet 10/325mg 1 po q 4-6 hours prn pain    Ultram 50 mg one p.o. q.4-6 hours p.r.n. breakthrough pain,    Phenergan 25 mg one p.o. q.4-6 hours p.r.n. nausea and vomiting.    Indocin 75mg po once daily x 4  days    Naprosyn 500mg po TWICE DAILY x 21 days starting POD 5    Prilosec 40mg po daily x 25 days    Colace 100mg BID PRN constipation      Patient was educated on the signs and symptoms of DVTs as well as the risk of their occurrence.     As there were no other questions to be asked, she was given my business card along with Marivel Summers MD business card if she has any questions or concerns prior to surgery or in the postop period.

## 2018-02-22 NOTE — ANESTHESIA PREPROCEDURE EVALUATION
02/22/2018  Sveta Saez is a 32 y.o., female.    Anesthesia Evaluation    I have reviewed the Patient Summary Reports.    I have reviewed the Nursing Notes.   I have reviewed the Medications.     Review of Systems  Anesthesia Hx:  No previous Anesthesia  Denies Family Hx of Anesthesia complications.   Denies Personal Hx of Anesthesia complications.   Social:  Non-Smoker    Hematology/Oncology:  Hematology Normal   Oncology Normal     EENT/Dental:EENT/Dental Normal   Cardiovascular:  Cardiovascular Normal     Pulmonary:  Pulmonary Normal    Renal/:  Renal/ Normal     Hepatic/GI:  Hepatic/GI Normal    Musculoskeletal:  Musculoskeletal Normal    Neurological:  Neurology Normal    Endocrine:  Endocrine Normal    Dermatological:  Skin Normal    Psych:  Psychiatric Normal           Physical Exam  General:  Well nourished      Dental:  Dental Findings: In tact        Mental Status:  Mental Status Findings:  Cooperative, Alert and Oriented         Anesthesia Plan  Type of Anesthesia, risks & benefits discussed:  Anesthesia Type:  general  Patient's Preference:   Intra-op Monitoring Plan:   Intra-op Monitoring Plan Comments:   Post Op Pain Control Plan: multimodal analgesia  Post Op Pain Control Plan Comments:   Induction:   IV  Beta Blocker:         Informed Consent: Patient understands risks and agrees with Anesthesia plan.  Questions answered. Anesthesia consent signed with patient.  ASA Score: 1     Day of Surgery Review of History & Physical:    H&P update referred to the surgeon.     Anesthesia Plan Notes: No lab        Ready For Surgery From Anesthesia Perspective.

## 2018-02-22 NOTE — PRE-PROCEDURE INSTRUCTIONS
"Reviewed 's hip arthroscopy discharge instructions and demonstrated the postoperative equipment (polar ice and BREG t-scope hip brace), with verbalized understanding per patient.  Reviewed          crutch teaching with verbalized understanding and  return demonstration per patient.  Fitted for crutches and hand  adjusted.  Height of crutches set on 5'4" and the height of the hand  placed on the 2nd hole from the top of the crutch..                     "

## 2018-02-22 NOTE — DISCHARGE INSTRUCTIONS
PRE-ADMIT TESTING -  [phone]    [address]  [place] [facility]          Your surgery has been scheduled at [facility]. We are pleased to have the opportunity to serve you. For Further Information please call [phone].    On the day of surgery please report to the Information Desk on the 1st floor.    · CONTACT YOUR PHYSICIAN'S OFFICE THE DAY PRIOR TO YOUR SURGERY TO OBTAIN YOUR ARRIVAL TIME.     · The evening before surgery do not eat anything after 9 p.m. ( this includes hard candy, chewing gum and mints).  You may only have GATORADE, POWERADE AND WATER  from 9 p.m. until you leave your home.   DO NOT DRINK ANY LIQUIDS ON THE WAY TO THE HOSPITAL.      SPECIAL MEDICATION INSTRUCTIONS: TAKE medications checked off by the Anesthesiologist on your Medication List.    Angiogram Patients: Take medications as instructed by your physician, including aspirin.     Surgery Patients:    If you take ASPIRIN - Your PHYSICIAN/SURGEON will need to inform you IF/OR when you need to stop taking aspirin prior to your surgery.     Do Not take any medications containing IBUPROFEN.  Do Not Wear any make-up or dark nail polish   (especially eye make-up) to surgery. If you come to surgery with makeup on you will be required to remove the makeup or nail polish.    Do not shave your surgical area at least 5 days prior to your surgery. The surgical prep will be performed at the hospital according to Infection Control regulations.    Leave all valuables at home.   Do Not wear any jewelry or watches, including any metal in body piercings.  Contact Lens must be removed before surgery. Either do not wear the contact lens or bring a case and solution for storage.  Please bring a container for eyeglasses or dentures as required.  Bring any paperwork your physician has provided, such as consent forms,  history and physicals, doctor's orders, etc.   Bring comfortable clothes that are loose fitting to wear upon  discharge. Take into consideration the type of surgery being performed.  Maintain your diet as advised per your physician the day prior to surgery.      Adequate rest the night before surgery is advised.   Park in the Parking lot behind the hospital or in the [facility] across the street from the parking lot. Parking is complimentary.  If you will be discharged the same day as your procedure, please arrange for a responsible adult to drive you home or to accompany you if traveling by taxi.   YOU WILL NOT BE PERMITTED TO DRIVE OR TO LEAVE THE HOSPITAL ALONE AFTER SURGERY.   It is strongly recommended that you arrange for someone to remain with you for the first 24 hrs following your surgery.       Thank you for your cooperation.  The Staff of [facility].        Bathing Instructions                                                                 Please shower the evening before and morning of your procedure with    ANTIBACTERIAL SOAP. ( DIAL, etc )  Concentrate on the surgical area   for at least 3 minutes and rinse completely. Dry off as usual.   Do not use any deodorant, powder, body lotions, perfume, after shave or    cologne.

## 2018-02-27 ENCOUNTER — PATIENT MESSAGE (OUTPATIENT)
Dept: SPORTS MEDICINE | Facility: CLINIC | Age: 33
End: 2018-02-27

## 2018-03-13 ENCOUNTER — SURGERY (OUTPATIENT)
Age: 33
End: 2018-03-13

## 2018-03-13 ENCOUNTER — HOSPITAL ENCOUNTER (OUTPATIENT)
Facility: OTHER | Age: 33
Discharge: HOME | End: 2018-03-13
Attending: ORTHOPAEDIC SURGERY | Admitting: ORTHOPAEDIC SURGERY
Payer: COMMERCIAL

## 2018-03-13 ENCOUNTER — ANESTHESIA (OUTPATIENT)
Dept: SURGERY | Facility: OTHER | Age: 33
End: 2018-03-13
Payer: COMMERCIAL

## 2018-03-13 VITALS
SYSTOLIC BLOOD PRESSURE: 121 MMHG | BODY MASS INDEX: 22.2 KG/M2 | OXYGEN SATURATION: 99 % | HEIGHT: 64 IN | RESPIRATION RATE: 16 BRPM | WEIGHT: 130 LBS | TEMPERATURE: 98 F | HEART RATE: 75 BPM | DIASTOLIC BLOOD PRESSURE: 80 MMHG

## 2018-03-13 DIAGNOSIS — S73.192D TEAR OF LEFT ACETABULAR LABRUM, SUBSEQUENT ENCOUNTER: ICD-10-CM

## 2018-03-13 DIAGNOSIS — S73.102D HIP SPRAIN, LEFT, SUBSEQUENT ENCOUNTER: ICD-10-CM

## 2018-03-13 LAB
ALBUMIN SERPL BCP-MCNC: 3.7 G/DL
B-HCG UR QL: NEGATIVE
CTP QC/QA: YES

## 2018-03-13 PROCEDURE — 17999 UNLISTD PX SKN MUC MEMB SUBQ: CPT | Mod: ,,, | Performed by: ORTHOPAEDIC SURGERY

## 2018-03-13 PROCEDURE — 36000710: Performed by: ORTHOPAEDIC SURGERY

## 2018-03-13 PROCEDURE — 25000003 PHARM REV CODE 250: Performed by: ANESTHESIOLOGY

## 2018-03-13 PROCEDURE — 71000039 HC RECOVERY, EACH ADD'L HOUR: Performed by: ORTHOPAEDIC SURGERY

## 2018-03-13 PROCEDURE — 29916 HIP ARTHRO W/LABRAL REPAIR: CPT | Mod: LT,,, | Performed by: ORTHOPAEDIC SURGERY

## 2018-03-13 PROCEDURE — 25000003 PHARM REV CODE 250: Performed by: PHYSICIAN ASSISTANT

## 2018-03-13 PROCEDURE — 25000003 PHARM REV CODE 250: Performed by: ORTHOPAEDIC SURGERY

## 2018-03-13 PROCEDURE — 63600175 PHARM REV CODE 636 W HCPCS: Performed by: SPECIALIST

## 2018-03-13 PROCEDURE — 63600175 PHARM REV CODE 636 W HCPCS: Performed by: NURSE ANESTHETIST, CERTIFIED REGISTERED

## 2018-03-13 PROCEDURE — 71000033 HC RECOVERY, INTIAL HOUR: Performed by: ORTHOPAEDIC SURGERY

## 2018-03-13 PROCEDURE — C1713 ANCHOR/SCREW BN/BN,TIS/BN: HCPCS | Performed by: ORTHOPAEDIC SURGERY

## 2018-03-13 PROCEDURE — 36000711: Performed by: ORTHOPAEDIC SURGERY

## 2018-03-13 PROCEDURE — 29914 HIP ARTHRO W/FEMOROPLASTY: CPT | Mod: 51,LT,, | Performed by: ORTHOPAEDIC SURGERY

## 2018-03-13 PROCEDURE — 25000003 PHARM REV CODE 250: Performed by: SPECIALIST

## 2018-03-13 PROCEDURE — 71000015 HC POSTOP RECOV 1ST HR: Performed by: ORTHOPAEDIC SURGERY

## 2018-03-13 PROCEDURE — 63600175 PHARM REV CODE 636 W HCPCS: Performed by: ORTHOPAEDIC SURGERY

## 2018-03-13 PROCEDURE — 71000016 HC POSTOP RECOV ADDL HR: Performed by: ORTHOPAEDIC SURGERY

## 2018-03-13 PROCEDURE — 81025 URINE PREGNANCY TEST: CPT | Performed by: PHYSICIAN ASSISTANT

## 2018-03-13 PROCEDURE — 82040 ASSAY OF SERUM ALBUMIN: CPT

## 2018-03-13 PROCEDURE — 63600175 PHARM REV CODE 636 W HCPCS: Performed by: ANESTHESIOLOGY

## 2018-03-13 PROCEDURE — 37000008 HC ANESTHESIA 1ST 15 MINUTES: Performed by: ORTHOPAEDIC SURGERY

## 2018-03-13 PROCEDURE — 27201423 OPTIME MED/SURG SUP & DEVICES STERILE SUPPLY: Performed by: ORTHOPAEDIC SURGERY

## 2018-03-13 PROCEDURE — 25000003 PHARM REV CODE 250: Performed by: NURSE ANESTHETIST, CERTIFIED REGISTERED

## 2018-03-13 PROCEDURE — 36415 COLL VENOUS BLD VENIPUNCTURE: CPT

## 2018-03-13 PROCEDURE — 37000009 HC ANESTHESIA EA ADD 15 MINS: Performed by: ORTHOPAEDIC SURGERY

## 2018-03-13 PROCEDURE — V2790 AMNIOTIC MEMBRANE: HCPCS | Performed by: ORTHOPAEDIC SURGERY

## 2018-03-13 DEVICE — TISSUE MATRIX BIOD RESTORE XLG: Type: IMPLANTABLE DEVICE | Site: HIP | Status: FUNCTIONAL

## 2018-03-13 DEVICE — ANCHOR SUT FIBERTAK 1.8 KNTLS: Type: IMPLANTABLE DEVICE | Site: HIP | Status: FUNCTIONAL

## 2018-03-13 RX ORDER — ROCURONIUM BROMIDE 10 MG/ML
INJECTION, SOLUTION INTRAVENOUS
Status: DISCONTINUED | OUTPATIENT
Start: 2018-03-13 | End: 2018-03-13

## 2018-03-13 RX ORDER — SODIUM CHLORIDE 0.9 % (FLUSH) 0.9 %
3 SYRINGE (ML) INJECTION
Status: DISCONTINUED | OUTPATIENT
Start: 2018-03-13 | End: 2018-03-13 | Stop reason: HOSPADM

## 2018-03-13 RX ORDER — KETOROLAC TROMETHAMINE 30 MG/ML
INJECTION, SOLUTION INTRAMUSCULAR; INTRAVENOUS
Status: DISCONTINUED | OUTPATIENT
Start: 2018-03-13 | End: 2018-03-13 | Stop reason: HOSPADM

## 2018-03-13 RX ORDER — ACETAMINOPHEN 10 MG/ML
1000 INJECTION, SOLUTION INTRAVENOUS ONCE
Status: COMPLETED | OUTPATIENT
Start: 2018-03-13 | End: 2018-03-13

## 2018-03-13 RX ORDER — HYDROMORPHONE HYDROCHLORIDE 2 MG/ML
0.4 INJECTION, SOLUTION INTRAMUSCULAR; INTRAVENOUS; SUBCUTANEOUS EVERY 5 MIN PRN
Status: DISCONTINUED | OUTPATIENT
Start: 2018-03-13 | End: 2018-03-13 | Stop reason: HOSPADM

## 2018-03-13 RX ORDER — CEFAZOLIN SODIUM 1 G/3ML
2 INJECTION, POWDER, FOR SOLUTION INTRAMUSCULAR; INTRAVENOUS
Status: DISCONTINUED | OUTPATIENT
Start: 2018-03-13 | End: 2018-03-13 | Stop reason: HOSPADM

## 2018-03-13 RX ORDER — DEXAMETHASONE SODIUM PHOSPHATE 4 MG/ML
8 INJECTION, SOLUTION INTRA-ARTICULAR; INTRALESIONAL; INTRAMUSCULAR; INTRAVENOUS; SOFT TISSUE ONCE
Status: COMPLETED | OUTPATIENT
Start: 2018-03-13 | End: 2018-03-13

## 2018-03-13 RX ORDER — LIDOCAINE HYDROCHLORIDE 10 MG/ML
0.5 INJECTION, SOLUTION EPIDURAL; INFILTRATION; INTRACAUDAL; PERINEURAL ONCE
Status: DISCONTINUED | OUTPATIENT
Start: 2018-03-13 | End: 2018-03-13 | Stop reason: HOSPADM

## 2018-03-13 RX ORDER — GLYCOPYRROLATE 0.2 MG/ML
INJECTION INTRAMUSCULAR; INTRAVENOUS
Status: DISCONTINUED | OUTPATIENT
Start: 2018-03-13 | End: 2018-03-13

## 2018-03-13 RX ORDER — ONDANSETRON 2 MG/ML
4 INJECTION INTRAMUSCULAR; INTRAVENOUS DAILY PRN
Status: DISCONTINUED | OUTPATIENT
Start: 2018-03-13 | End: 2018-03-13 | Stop reason: HOSPADM

## 2018-03-13 RX ORDER — ONDANSETRON 2 MG/ML
INJECTION INTRAMUSCULAR; INTRAVENOUS
Status: DISCONTINUED | OUTPATIENT
Start: 2018-03-13 | End: 2018-03-13

## 2018-03-13 RX ORDER — MIDAZOLAM HYDROCHLORIDE 1 MG/ML
INJECTION INTRAMUSCULAR; INTRAVENOUS
Status: DISCONTINUED | OUTPATIENT
Start: 2018-03-13 | End: 2018-03-13

## 2018-03-13 RX ORDER — KETAMINE HYDROCHLORIDE 50 MG/ML
INJECTION, SOLUTION INTRAMUSCULAR; INTRAVENOUS
Status: DISCONTINUED | OUTPATIENT
Start: 2018-03-13 | End: 2018-03-13 | Stop reason: HOSPADM

## 2018-03-13 RX ORDER — PROPOFOL 10 MG/ML
VIAL (ML) INTRAVENOUS
Status: DISCONTINUED | OUTPATIENT
Start: 2018-03-13 | End: 2018-03-13

## 2018-03-13 RX ORDER — MUPIROCIN 20 MG/G
OINTMENT TOPICAL
Status: DISCONTINUED | OUTPATIENT
Start: 2018-03-13 | End: 2018-03-13 | Stop reason: HOSPADM

## 2018-03-13 RX ORDER — LIDOCAINE HCL/PF 100 MG/5ML
SYRINGE (ML) INTRAVENOUS
Status: DISCONTINUED | OUTPATIENT
Start: 2018-03-13 | End: 2018-03-13

## 2018-03-13 RX ORDER — MIDAZOLAM HYDROCHLORIDE 5 MG/ML
2 INJECTION INTRAMUSCULAR; INTRAVENOUS
Status: COMPLETED | OUTPATIENT
Start: 2018-03-13 | End: 2018-03-13

## 2018-03-13 RX ORDER — NEOSTIGMINE METHYLSULFATE 1 MG/ML
INJECTION, SOLUTION INTRAVENOUS
Status: DISCONTINUED | OUTPATIENT
Start: 2018-03-13 | End: 2018-03-13

## 2018-03-13 RX ORDER — PROMETHAZINE HYDROCHLORIDE 25 MG/1
25 TABLET ORAL EVERY 6 HOURS PRN
Status: DISCONTINUED | OUTPATIENT
Start: 2018-03-13 | End: 2018-03-13 | Stop reason: HOSPADM

## 2018-03-13 RX ORDER — OXYCODONE HYDROCHLORIDE 5 MG/1
10 TABLET ORAL EVERY 4 HOURS PRN
Status: DISCONTINUED | OUTPATIENT
Start: 2018-03-13 | End: 2018-03-13 | Stop reason: HOSPADM

## 2018-03-13 RX ORDER — EPINEPHRINE 1 MG/ML
INJECTION, SOLUTION INTRACARDIAC; INTRAMUSCULAR; INTRAVENOUS; SUBCUTANEOUS
Status: DISCONTINUED | OUTPATIENT
Start: 2018-03-13 | End: 2018-03-13 | Stop reason: HOSPADM

## 2018-03-13 RX ORDER — OXYCODONE HCL 10 MG/1
10 TABLET, FILM COATED, EXTENDED RELEASE ORAL
Status: COMPLETED | OUTPATIENT
Start: 2018-03-13 | End: 2018-03-13

## 2018-03-13 RX ORDER — PREGABALIN 75 MG/1
75 CAPSULE ORAL ONCE
Status: COMPLETED | OUTPATIENT
Start: 2018-03-13 | End: 2018-03-13

## 2018-03-13 RX ORDER — MEPERIDINE HYDROCHLORIDE 50 MG/ML
12.5 INJECTION INTRAMUSCULAR; INTRAVENOUS; SUBCUTANEOUS ONCE AS NEEDED
Status: COMPLETED | OUTPATIENT
Start: 2018-03-13 | End: 2018-03-13

## 2018-03-13 RX ORDER — FENTANYL CITRATE 50 UG/ML
25 INJECTION, SOLUTION INTRAMUSCULAR; INTRAVENOUS EVERY 5 MIN PRN
Status: DISCONTINUED | OUTPATIENT
Start: 2018-03-13 | End: 2018-03-13 | Stop reason: HOSPADM

## 2018-03-13 RX ORDER — ONDANSETRON 2 MG/ML
4 INJECTION INTRAMUSCULAR; INTRAVENOUS EVERY 12 HOURS PRN
Status: DISCONTINUED | OUTPATIENT
Start: 2018-03-13 | End: 2018-03-13 | Stop reason: HOSPADM

## 2018-03-13 RX ORDER — TRAMADOL HYDROCHLORIDE 50 MG/1
100 TABLET ORAL ONCE
Status: COMPLETED | OUTPATIENT
Start: 2018-03-13 | End: 2018-03-13

## 2018-03-13 RX ORDER — SCOLOPAMINE TRANSDERMAL SYSTEM 1 MG/1
1 PATCH, EXTENDED RELEASE TRANSDERMAL ONCE
Status: DISCONTINUED | OUTPATIENT
Start: 2018-03-13 | End: 2018-03-13 | Stop reason: HOSPADM

## 2018-03-13 RX ORDER — FENTANYL CITRATE 50 UG/ML
INJECTION, SOLUTION INTRAMUSCULAR; INTRAVENOUS
Status: DISCONTINUED | OUTPATIENT
Start: 2018-03-13 | End: 2018-03-13

## 2018-03-13 RX ORDER — FAMOTIDINE 20 MG/1
20 TABLET, FILM COATED ORAL
Status: COMPLETED | OUTPATIENT
Start: 2018-03-13 | End: 2018-03-13

## 2018-03-13 RX ORDER — OXYCODONE HYDROCHLORIDE 5 MG/1
5 TABLET ORAL
Status: DISCONTINUED | OUTPATIENT
Start: 2018-03-13 | End: 2018-03-13 | Stop reason: HOSPADM

## 2018-03-13 RX ORDER — ROPIVACAINE HYDROCHLORIDE 5 MG/ML
INJECTION, SOLUTION EPIDURAL; INFILTRATION; PERINEURAL
Status: DISCONTINUED | OUTPATIENT
Start: 2018-03-13 | End: 2018-03-13 | Stop reason: HOSPADM

## 2018-03-13 RX ORDER — SODIUM CHLORIDE, SODIUM LACTATE, POTASSIUM CHLORIDE, CALCIUM CHLORIDE 600; 310; 30; 20 MG/100ML; MG/100ML; MG/100ML; MG/100ML
INJECTION, SOLUTION INTRAVENOUS CONTINUOUS
Status: DISCONTINUED | OUTPATIENT
Start: 2018-03-13 | End: 2018-03-13 | Stop reason: HOSPADM

## 2018-03-13 RX ORDER — MORPHINE SULFATE 10 MG/ML
2 INJECTION INTRAMUSCULAR; INTRAVENOUS; SUBCUTANEOUS EVERY 10 MIN PRN
Status: DISCONTINUED | OUTPATIENT
Start: 2018-03-13 | End: 2018-03-13 | Stop reason: HOSPADM

## 2018-03-13 RX ADMIN — FAMOTIDINE 20 MG: 20 TABLET, FILM COATED ORAL at 05:03

## 2018-03-13 RX ADMIN — HYDROMORPHONE HYDROCHLORIDE 0.4 MG: 2 INJECTION, SOLUTION INTRAMUSCULAR; INTRAVENOUS; SUBCUTANEOUS at 12:03

## 2018-03-13 RX ADMIN — FENTANYL CITRATE 50 MCG: 50 INJECTION, SOLUTION INTRAMUSCULAR; INTRAVENOUS at 10:03

## 2018-03-13 RX ADMIN — OXYCODONE HYDROCHLORIDE 10 MG: 10 TABLET, FILM COATED, EXTENDED RELEASE ORAL at 05:03

## 2018-03-13 RX ADMIN — SODIUM CHLORIDE, SODIUM LACTATE, POTASSIUM CHLORIDE, AND CALCIUM CHLORIDE: 600; 310; 30; 20 INJECTION, SOLUTION INTRAVENOUS at 06:03

## 2018-03-13 RX ADMIN — MEPERIDINE HYDROCHLORIDE 12.5 MG: 50 INJECTION INTRAMUSCULAR; INTRAVENOUS; SUBCUTANEOUS at 12:03

## 2018-03-13 RX ADMIN — PROPOFOL 200 MG: 10 INJECTION, EMULSION INTRAVENOUS at 07:03

## 2018-03-13 RX ADMIN — HYDROMORPHONE HYDROCHLORIDE 0.4 MG: 2 INJECTION, SOLUTION INTRAMUSCULAR; INTRAVENOUS; SUBCUTANEOUS at 11:03

## 2018-03-13 RX ADMIN — PROMETHAZINE HYDROCHLORIDE 6.25 MG: 25 INJECTION INTRAMUSCULAR; INTRAVENOUS at 12:03

## 2018-03-13 RX ADMIN — DEXAMETHASONE SODIUM PHOSPHATE 8 MG: 4 INJECTION, SOLUTION INTRAMUSCULAR; INTRAVENOUS at 04:03

## 2018-03-13 RX ADMIN — ONDANSETRON 4 MG: 2 INJECTION INTRAMUSCULAR; INTRAVENOUS at 10:03

## 2018-03-13 RX ADMIN — NEOSTIGMINE METHYLSULFATE 5 MG: 1 INJECTION INTRAVENOUS at 10:03

## 2018-03-13 RX ADMIN — MIDAZOLAM HYDROCHLORIDE 5 MG: 5 INJECTION, SOLUTION INTRAMUSCULAR; INTRAVENOUS at 05:03

## 2018-03-13 RX ADMIN — KETAMINE HYDROCHLORIDE 60 MG: 50 INJECTION, SOLUTION INTRAMUSCULAR; INTRAVENOUS at 10:03

## 2018-03-13 RX ADMIN — TRAMADOL HYDROCHLORIDE 100 MG: 50 TABLET, FILM COATED ORAL at 11:03

## 2018-03-13 RX ADMIN — OXYCODONE HYDROCHLORIDE 5 MG: 5 TABLET ORAL at 11:03

## 2018-03-13 RX ADMIN — ACETAMINOPHEN 1000 MG: 10 INJECTION, SOLUTION INTRAVENOUS at 12:03

## 2018-03-13 RX ADMIN — EPINEPHRINE 3 MG: 1 INJECTION, SOLUTION INTRAMUSCULAR; SUBCUTANEOUS at 10:03

## 2018-03-13 RX ADMIN — FENTANYL CITRATE 100 MCG: 50 INJECTION, SOLUTION INTRAMUSCULAR; INTRAVENOUS at 07:03

## 2018-03-13 RX ADMIN — ROCURONIUM BROMIDE 40 MG: 10 INJECTION, SOLUTION INTRAVENOUS at 07:03

## 2018-03-13 RX ADMIN — LIDOCAINE HYDROCHLORIDE 75 MG: 20 INJECTION, SOLUTION INTRAVENOUS at 07:03

## 2018-03-13 RX ADMIN — CEFAZOLIN 2 G: 330 INJECTION, POWDER, FOR SOLUTION INTRAMUSCULAR; INTRAVENOUS at 08:03

## 2018-03-13 RX ADMIN — SCOLOPAMINE TRANSDERMAL SYSTEM 1 PATCH: 1 PATCH, EXTENDED RELEASE TRANSDERMAL at 04:03

## 2018-03-13 RX ADMIN — PREGABALIN 75 MG: 75 CAPSULE ORAL at 11:03

## 2018-03-13 RX ADMIN — CARBOXYMETHYLCELLULOSE SODIUM 2 DROP: 2.5 SOLUTION/ DROPS OPHTHALMIC at 07:03

## 2018-03-13 RX ADMIN — GLYCOPYRROLATE 0.8 MG: 0.2 INJECTION, SOLUTION INTRAMUSCULAR; INTRAVENOUS at 10:03

## 2018-03-13 RX ADMIN — ROPIVACAINE HYDROCHLORIDE 30 ML: 5 INJECTION, SOLUTION EPIDURAL; INFILTRATION; PERINEURAL at 10:03

## 2018-03-13 RX ADMIN — KETOROLAC TROMETHAMINE 60 MG: 30 INJECTION, SOLUTION INTRAMUSCULAR; INTRAVENOUS at 10:03

## 2018-03-13 RX ADMIN — MUPIROCIN: 20 OINTMENT TOPICAL at 05:03

## 2018-03-13 RX ADMIN — MIDAZOLAM HYDROCHLORIDE 2 MG: 1 INJECTION, SOLUTION INTRAMUSCULAR; INTRAVENOUS at 06:03

## 2018-03-13 NOTE — TRANSFER OF CARE
"Anesthesia Transfer of Care Note    Patient: Sveta Saez    Procedure(s) Performed: Procedure(s) (LRB):  PARTIAL SYNOVECTOMY-HIP C-ARM / (Left)  INJECTION-STEROID arthrscopic assisted Bio D injection to the left hip (Left)  ARTHROSCOPY-HIP (Left)  DEBRIDEMENT (Left)  OSTEOPLASTY FEMORAL NECK (Left)  CAPSULAR CLOSURE (Left)  REPAIR-LABRAL (Left)    Patient location: PACU    Anesthesia Type: general    Transport from OR: Transported from OR on room air with adequate spontaneous ventilation    Post pain: adequate analgesia    Post assessment: no apparent anesthetic complications    Post vital signs: stable    Level of consciousness: awake    Nausea/Vomiting: no nausea/vomiting    Complications: none    Transfer of care protocol was followed      Last vitals:   Visit Vitals  /73   Pulse 81   Temp 36.4 °C (97.6 °F)   Resp 18   Ht 5' 4" (1.626 m)   Wt 59 kg (130 lb)   SpO2 99%   BMI 22.31 kg/m²     "

## 2018-03-13 NOTE — DISCHARGE SUMMARY
[facility]  Brief Operative Note     SUMMARY     Surgery Date: 3/13/2018     Surgeon(s) and Role:     * Marivel Summers MD - Primary    Assisting Surgeon: Pranay Cochran MD, PGY6    Pre-op Diagnosis:  Disorder of lower leg joint [M25.9]  Tear of acetabular labrum, unspecified laterality, subsequent encounter [S73.199D]  Strain of hip and thigh, unspecified laterality, subsequent encounter [S76.019D, S76.919D]    Post-op Diagnosis:  Post-Op Diagnosis Codes:     * Disorder of lower leg joint [M25.9]     * Tear of acetabular labrum, unspecified laterality, subsequent encounter [S73.199D]     * Strain of hip and thigh, unspecified laterality, subsequent encounter [S76.019D, S76.919D]    Procedure(s) (LRB):  PARTIAL SYNOVECTOMY-HIP C-ARM / (Left)  INJECTION-STEROID arthrscopic assisted Bio D injection to the left hip (Left)  ARTHROSCOPY-HIP (Left)  DEBRIDEMENT (Left)  OSTEOPLASTY FEMORAL NECK (Left)  CAPSULAR CLOSURE (Left)  REPAIR-LABRAL (Left)    Anesthesia: General    Description of the findings of the procedure: left hip arthroscopic labral repair    Findings/Key Components: left hip arthroscopic labral repair    Estimated Blood Loss: minimal         Specimens:   Specimen (12h ago through future)    None          Discharge Note    SUMMARY     Admit Date: 3/13/2018    Discharge Date and Time:  03/13/2018 10:55 AM    Hospital Course (synopsis of major diagnoses, care, treatment, and services provided during the course of the hospital stay): Patient underwent outpatient hip surgery and was transferred to PACU in stable condition.  In PACU, patient received appropriate post-operative care and discharged home with plans for physical therapy and follow-up with the operative surgeon.    Diet: Regular       Final Diagnosis: Post-Op Diagnosis Codes:     * Disorder of lower leg joint [M25.9]     * Tear of acetabular labrum, unspecified laterality, subsequent encounter [S73.199D]     * Strain of hip and thigh,  unspecified laterality, subsequent encounter [S76.019D, S76.599W]    Disposition: Home or Self Care    Follow Up/Patient Instructions:     Medications:  Reconciled Home Medications:   Current Discharge Medication List      CONTINUE these medications which have NOT CHANGED    Details   butalbital-aspirin-caffeine (BUTALBITAL COMPOUND) -40 mg Tab Take 40 mg by mouth.      levocetirizine (XYZAL) 5 MG tablet Take 5 mg by mouth every evening.      meclizine (ANTIVERT) 32 MG tablet Take 25 mg by mouth 3 (three) times daily as needed.      naproxen (NAPROSYN) 500 MG tablet Take 1 tablet (500 mg total) by mouth 2 (two) times daily with meals. Take one tablet twice daily starting post op day 5 for 21 days  Qty: 42 tablet, Refills: 0    Associated Diagnoses: Hip sprain, left, subsequent encounter; Tear of left acetabular labrum, subsequent encounter      omeprazole (PRILOSEC) 40 MG capsule Take 1 capsule (40 mg total) by mouth once daily. Take with indomethocin and naproxen  Qty: 25 capsule, Refills: 0    Associated Diagnoses: Hip sprain, left, subsequent encounter; Tear of left acetabular labrum, subsequent encounter      oxyCODONE-acetaminophen (PERCOCET)  mg per tablet Take 1 tablet by mouth every 4 to 6 hours as needed for Pain. Take stool softener with this medication  Qty: 40 tablet, Refills: 0    Associated Diagnoses: Hip sprain, left, subsequent encounter; Tear of left acetabular labrum, subsequent encounter      promethazine (PHENERGAN) 25 MG tablet Take 1 tablet (25 mg total) by mouth every 6 (six) hours as needed for Nausea.  Qty: 40 tablet, Refills: 0    Associated Diagnoses: Hip sprain, left, subsequent encounter; Tear of left acetabular labrum, subsequent encounter      traMADol (ULTRAM) 50 mg tablet Take 1-2 tablets ( mg total) by mouth every 6 (six) hours as needed for Pain.  Qty: 40 tablet, Refills: 0    Associated Diagnoses: Hip sprain, left, subsequent encounter; Tear of left acetabular  "labrum, subsequent encounter             Discharge Procedure Orders  CRUTCHES FOR HOME USE   Order Comments: Provide if needed   Order Specific Question Answer Comments   Type: Axillary    Height: 5' 4" (1.626 m)    Weight: 59 kg (130 lb)    Does patient have medical equipment at home? none    Length of need (1-99 months): 24      Diet general     Call MD for:  temperature >100.4     Call MD for:  persistent nausea and vomiting     Call MD for:  severe uncontrolled pain     Call MD for:  difficulty breathing, headache or visual disturbances     Call MD for:  redness, tenderness, or signs of infection (pain, swelling, redness, odor or green/yellow discharge around incision site)     Call MD for:  hives     Call MD for:  persistent dizziness or light-headedness     Shower on day dressing removed (No bath)     Ice to affected area     Weight bearing restrictions (specify)   Order Comments: Patient is to be partial weightbearing (approximately 30% weightbearing) for 3 weeks after surgery using crutches and brace.     No driving, operating heavy equipment or signing legal documents while taking pain medication     Remove dressing in 72 hours     Prior Authorization Order   Order Specific Question Answer Comments   What medications need authorization? LYRICA [[id number]]    Dose 75mg lyrica    Frequency once pre-op and once post-op        Follow-up Information     Marivel Summers MD. Go in 2 weeks.    Specialties:  Sports Medicine, Orthopedic Surgery  Why:  For wound re-check  Contact information:  [address] [place] [address]  [phone]                 "

## 2018-03-13 NOTE — INTERVAL H&P NOTE
The patient has been examined and the H&P has been reviewed:    I concur with the findings and no changes have occurred since H&P was written.    Anesthesia/Surgery risks, benefits and alternative options discussed and understood by patient/family.          Active Hospital Problems    Diagnosis  POA    Tear of left acetabular labrum [S73.192A]  Yes      Resolved Hospital Problems    Diagnosis Date Resolved POA   No resolved problems to display.

## 2018-03-13 NOTE — OP NOTE
DATE OF PROCEDURE: 3/13/2018    SURGEON:  Marivel Summers M.D.    ASSISTANT: WILMA Cochran MD PGY6  ASSISTANT: CAMPOS Landaverde PA-C      PREOPERATIVE DIAGNOSIS:    Left:  1. Hip labral tear  2. Hip chondrolabral dysfunction  3. Hip synovitis  4. Hip capsular laxity    POSTOPERATIVE DIAGNOSIS:    Left:  1. Hip labral tear  2. Hip chondrolabral dysfunction  3. Hip synovitis  4. Hip capsular laxity    PROCEDURE:    Left:  1. Hip arthroscopic labral repair, knotless FiberTak (CPT 69412)  2. Hip arthroscopic femoral neck osteoplasty (CPT 57445)  3. Hip arthroscopic partial synovectomy/debridement (CPT 23417)  4. Hip arthroscopic capsular closure  5. Hip arthroscopic-assisted arthrocentesis of viable structural human allograft tissue      matrix (BioDRestore, supralabral recess) (CPT 79101)  6. Hip fluoroscopic guidance for needle placement/localization (CPT 75251)    ANESTHESIA:  General with local 0.5% ropivicaine 30ml (5mg/ml), 60 mg ketamine, 60mg toradol (2ml toradol (30mg/ml))    BLOOD LOSS:  Minimal.    DRAINS:  None.    COMPLICATIONS:  None.    TOURNIQUET TIME:  None.    DISPOSITION:  The patient was moved to the recovery room in stable condition, with extremity and abdominal compartments soft and capillary refill less than a second in all digits.    BRIEF INDICATION OF MEDICAL NECESSITY:  The patient is a 32 y.o. year-old female  who was seen in the office with a history with a history and physical examination findings consistent with the above.  Details of non-operative versus operative options were discussed.  The risks and benefits were discussed with the patient.  The patient acknowledged understanding and wished to proceed with an operative intervention.  Informed consent was obtained prior to the procedure.  Details of the procedure were explained including probable incisions and probable rehabilitation course.  The patient understands the likely length of convalescence after surgery; and the risks, benefits and  alternatives of the surgery were explained.  Reasonable expectations and potential complications were discussed and acknowledged including, but not limited to: infection, bleeding, blood clots (DVT and/or PE), nerve injury, heterotopic ossification, re-tear, instability, fracture, continued pain, loss of function, stiffness, arthritis, need for further surgery, skin breakdown, pudendal nerve palsy.  It was explained there was a chance of failure which may require further management.  The patient agreed and understood and wished to proceed.    PROCEDURE IN DETAIL:  The operative site was marked by the operative surgeon. The patient was brought into the operating room.  The patient was then carefully moved to the hip table.  General anesthesia was administered by the anesthesia team.  All pressure points were carefully padded and checked.  Both feet were then placed in well-padded foot holders in comfortable positions.  The upper extremities were placed in comfortable positions and carefully checked.  Balanced suspension was placed on the operative lower extremity with mild balanced suspension to the nonoperative lower extremity.  The upper extremities were then again checked thoroughly.  The C-arm image was brought into the operating room and the procedure was done under the guidance of the C-arm.  When adequate joint distraction was achieved the operative hip was prepped and draped in the usual sterile fashion.  5cc skin and sub-cutaneous tissue was infilttrated with local anesthetic mixture at each portal site; mid-lateral followed by infero-medial portals were created, and a systematic examination of the joint revealed the following:    A standard anterolateral portal was created under fluoroscopic visualization.  A standard mid-anterior portal was then created.  Capsulotomies were performed with a [place] blade.  Confirmation of non-translabral entry into the hip joint was confirmed with both portals.     There  was synovitis noted about the hip joint.  This was debrided using thermal device and shaver.      There was no evidence of any chondral lesions to the femoral head or acetabulum.      No loose bodies were identified in the central compartment.    The labrum was probed carefully and an anterior-superior labral tear was encountered.      The labral repair proceeded.  Labral repair tissue was carefully prepared.  The labrum was not detached, acetabular bone was then prepared (1mm), and burred to bleeding surface. Next, a 1.6 mm Arthrex FiberTak soft anchor was placed in the non articular bone of the acetabulum.  Confirmation of non-engagement of the chondral surface was ensured.  This was repeated for 2 more anchor for a total of 3 anchors.  The anchors had a good purchase and suture was placed using the suture passing device securing the labrum nicely.  This gave 3 sutures holding the labrum and the labral repair was good and the labrum was secured and was probed and found to be quite stable    The peripheral compartment was then inspected.  Traction was released and the hip was flexed to 45 degrees and the camera was passed along the femoral head-neck junction into the peripheral space.  The femoral head and neck junction was visualized.  The peripheral compartment instrumentation was placed through the mid-anterior portal.  The anterior femoral head and neck junction were visualized and the area of the CAM lesion was identified.  Dynamic examination of the hip motion, femoro-acetabular and labral articulation was performed showing the CAM lesion.  A 5 mm round bur was used for the femoral neck offset, transforming the surface of the CAM lesion into a concave surface.  The resection was approximately 4 mm deep and approximately 55 mm wide.  Resection was performed along the anterior head from the medial 6 o'clock position to the lateral 12 o'clock position.  Periodic dynamic examination was performed.  Hip was stable  to dynamic examination intra-operatively.    There was no evidence of any loose bodies in the peripheral compartment.     Hip capsule 'T'-capsulotomy was closed using a #2 Fiberwire suture x 2 tied in a simple fashion.    Viable structural human allograft tissue matrix (BioDRestore) was injected into joint at supralabral recess under direct arthroscopic visualization after irrigation, as described below, was completed.    The hip was then copiously irrigated and the fluid was extravasated using suction.  Local anesthetic 10 cc was instilled around each portal.  The portal sites were closed using 3-0 nylon suture in an interrupted fashion.  The incisions were dressed with Xeroform, 4 x 4's, abd pads and MediPore tape.  TENS unit pads were placed which were medically necessary for pain relief.  An iceman and hip brace were secured.  The patient was then extubated, moved to the recovery room where the patient arrived in stable condition with extremity and abdominal compartments soft and capillary refill less than a second in all digits.      POSTOPERATIVE PLAN: We will follow the hip arthroscopy with osteoplasty, capsular closure, and labral repair guidelines.  The patient will be partial weightbearing for 3 weeks.

## 2018-03-13 NOTE — PLAN OF CARE
Sveta Saez has met all discharge criteria from Phase II. Vital Signs are stable, ambulating  without difficulty.Pain is now under control and tolerable for the pt. Pain score 2/10 at this time.  Discharge instructions given, patient verbalized understanding. Discharged from facility via wheelchair in stable condition.

## 2018-03-13 NOTE — OR NURSING
Pt has bracelets on left wrist which are unable to be removed, wrist and bracelets wrapped in coban dressing (pt states anesthesia aware of bracelets)

## 2018-03-13 NOTE — ANESTHESIA POSTPROCEDURE EVALUATION
"Anesthesia Post Evaluation    Patient: Sveta Saez    Procedure(s) Performed: Procedure(s) (LRB):  PARTIAL SYNOVECTOMY-HIP C-ARM / (Left)  INJECTION-STEROID arthrscopic assisted Bio D injection to the left hip (Left)  ARTHROSCOPY-HIP (Left)  DEBRIDEMENT (Left)  OSTEOPLASTY FEMORAL NECK (Left)  CAPSULAR CLOSURE (Left)  REPAIR-LABRAL (Left)    Final Anesthesia Type: general  Patient location during evaluation: PACU  Patient participation: Yes- Able to Participate  Level of consciousness: oriented and awake  Post-procedure vital signs: reviewed and stable  Pain management: adequate  Airway patency: patent  PONV status at discharge: No PONV  Anesthetic complications: no      Cardiovascular status: hemodynamically stable  Respiratory status: unassisted, spontaneous ventilation and room air  Hydration status: euvolemic  Follow-up not needed.        Visit Vitals  BP (!) 141/74   Pulse 74   Temp 36.4 °C (97.5 °F)   Resp 18   Ht 5' 4" (1.626 m)   Wt 59 kg (130 lb)   SpO2 (!) 93%   BMI 22.31 kg/m²       Pain/Bob Score: Pain Assessment Performed: Yes (3/13/2018 12:59 PM)  Presence of Pain: complains of pain/discomfort (3/13/2018 12:47 PM)  Pain Rating Prior to Med Admin: 5 (3/13/2018 12:42 PM)  Pain Rating Post Med Admin: 5 (3/13/2018 12:41 PM)  Bob Score: 10 (3/13/2018 12:43 PM)      "

## 2018-03-13 NOTE — H&P (VIEW-ONLY)
Sveta Saez  is here for a completion of her perioperative paperwork. she  Is scheduled to undergo left hip labral repair vs. synovectomy on 3/13/18.  She is a healthy individual and does not need clearance for this procedure.     Risks, indications and benefits of the surgical procedure were discussed with the patient. All questions with regard to surgery, rehab, expected return to functional activities, activities of daily living and recreational endeavors were answered to her satisfaction.    Once no other questions were asked, a brief history and physical exam was then performed.      PHYSICAL EXAM:  GEN: A&Ox3, WD WN NAD  HEENT: WNL  CHEST: CTAB, no W/R/R  HEART: RRR, no M/R/G  ABD: Soft, NT ND, BS x4 QUADS  MS; See Epic  NEURO: CN II-XII intact       The surgical consent was then reviewed with the patient, who agreed with all the contents of the consent form and it was signed. she was then given the [facility] surgery packet to bring with her to [facility] for the anesthesia portion of her perioperative paperwork.     PHYSICAL THERAPY:  She was also instructed regarding physical therapy and will begin on  POD#1. She was given a copy of the original prescription to schedule. Another copy of this prescription was also faxed to [facility].    POST OP CARE:instructions were reviewed including care of the wound and dressing after surgery and when she can shower.     PAIN MANAGEMENT: Sveta Saez was also given her pain management regime, which includes the TENS unit given to her by Alicia along with the education required for its use. She was also instructed regarding the Polar ice unit that will be in place after surgery and her postoperative pain medications.     MEDICATION:  PAIN MEDICATION:    Percocet 10/325mg 1 po q 4-6 hours prn pain    Ultram 50 mg one p.o. q.4-6 hours p.r.n. breakthrough pain,    Phenergan 25 mg one p.o. q.4-6 hours p.r.n. nausea and vomiting.    Indocin 75mg po once daily x 4  days    Naprosyn 500mg po TWICE DAILY x 21 days starting POD 5    Prilosec 40mg po daily x 25 days    Colace 100mg BID PRN constipation      Patient was educated on the signs and symptoms of DVTs as well as the risk of their occurrence.     As there were no other questions to be asked, she was given my business card along with Marivel Summers MD business card if she has any questions or concerns prior to surgery or in the postop period.

## 2018-03-13 NOTE — OR NURSING
Pt denied nausea but vomited unmeasured amt after tramadol.no pill particles noted in emesis. Med with 6.25mg iv phenergan. Nausea subsided. Pt crying. States its due to pain. spo2 100% on ra. bp stable. Cont with dilaudid iv per orders see mar.shivering treated with iv demerol 12.5mg iv

## 2018-03-13 NOTE — DISCHARGE INSTRUCTIONS

## 2018-03-14 ENCOUNTER — CLINICAL SUPPORT (OUTPATIENT)
Dept: REHABILITATION | Facility: HOSPITAL | Age: 33
End: 2018-03-14
Payer: COMMERCIAL

## 2018-03-14 DIAGNOSIS — M25.552 ACUTE HIP PAIN, LEFT: ICD-10-CM

## 2018-03-14 PROCEDURE — 97161 PT EVAL LOW COMPLEX 20 MIN: CPT | Performed by: PHYSICAL THERAPIST

## 2018-03-14 PROCEDURE — 97110 THERAPEUTIC EXERCISES: CPT | Performed by: PHYSICAL THERAPIST

## 2018-03-14 NOTE — PROGRESS NOTES
[facility]   PATIENT EVALUATION    Date of Evaluation: 03/14/2018  Visit #: 1 of 20 end 12/31/18    Start Time: 1100  Stop Time: 1200  Total treatment time: 60'    History     Onset Date: 2 year hx of insidious L hip pain   Date of Surgery: 3/13/18  1. Hip arthroscopic labral repair, knotless FiberTak (CPT 55996)  2. Hip arthroscopic femoral neck osteoplasty (CPT 88200)  3. Hip arthroscopic partial synovectomy/debridement (CPT 44406)  4. Hip arthroscopic capsular closure  5. Hip arthroscopic-assisted arthrocentesis of viable structural human allograft tissue      matrix (BioDRestore, supralabral recess) (CPT 02303)  6. Hip fluoroscopic guidance for needle placement/localization (CPT 87916)  Primary Diagnosis:    1. Acute hip pain, left       Treatment Diagnosis:   Past Medical History:   Diagnosis Date    Abnormal Pap smear of cervix 2010     Precautions: ROM limitations: FLEXION < 90 deg x 10 days, EXT 0 deg x 3 wks, ER 0 deg x 3 wks, IR no limits; weight bearing restriction 20# x 3 wks, brace 0-90 deg x 10 days, boot at night x 14 days  Diagnostic Tests: see chart, MRI (+) for labral tear  Prior Level of Function: Independent  Sports/Recreational Activities: running, occasional workout  Patient Therapy Goals: return to prior level of activity pain free    Subjective     AMH:  Sveta Saez states her L hip started hurting a few years ago and pain/discomfort progressively increased to significant pain with activity and heavy ADLs suck as bending and lifting. Patient had to stop exercising due to pain and reported 6/10 constant pain in L hip prior to surgery, pain would occasionally wake patient from sleep at night. Patient had arthroscopic hip surgery 3/13/18 for labral repair and femoral neck osteoplasty with Dr. Summers. Patient reports her hip is feeling OK but has minor aches and pain. Patient is employed in an IT department.    Pain:  Location: L Hip   Description:  "post-surgical soreness and pain  Activities Which Increase Pain: weight bearing, A/PROM activities beyond prescribed limits, movement  Activities Which Decrease Pain: pain medication, ice and rest  Pain Scale: 2/10 at best 2/10 now  Not Stated/10 at worst    Objective     Observation: unable to inspect incision and surrounding skin of L hip due to surgical bandages. Patient does not have overt atrophy of the leg musculature at this point.  Gait: With AD.  Device Used -  Axillary crutches Assistance Mod I  Passive Range of motion   Right    Left - NT d/t post-op patient       Strength R / L - NT d/t post-op patient    Palpation: unable to palpate directly due to surgical bandaging  Special Tests: N/A  Treatment:  Patient and  educated on HEP including:  Ankle pumps 5 min 3-4 times/day, quad sets 20 reps 1-2 times/day, hamstring sets 20 reps 1-2 times/day, and transversus abdominus isometrics 10:10 1-2 times/day  PROM (to be performed by  at home) supine hip IR log roll 20 reps 1-2 times/day, supine hip circumduction at ~20 deg flexion 20 reps CW, 20 rep CCW 1-2 times/day  Ascend/descend 4 6" steps with L handrail and axillary crutch  Patient and  educated on surgical procedure, ROM precautions x 3 weeks, limit weight bearing to 20# on LLE x 3 weeks, and transition to lying prone 5-7min throughout day to relieve pressure on butt and back  CP x 10'    HEP PROM was filmed on patient phone for future reference    Functional Limitations Reports - G Codes  Category: mobility  Tool: FOTO    Score:  29/80  Goal: 57/80    TEST SCORE  Modifier  Impairment Limitation Restriction    80/80  CH  0 % impaired, limited or restricted   64-79/80  CI  @ least 1% but less than 20% impaired, limited or restricted   49-63/80  CJ  @ least 20%<40% impaired, limited or restricted   33-48/80  CK  @ least 40%<60% impaired, limited or restricted   17-32/80  CL  @ least 60% <80% impaired, limited or restricted   1-16/80  CM  " @ least 80%<100% impaired limited or restricted   0/80  CN  100% impaired, limited or restricted     Current/ CL = least 60% but < 80% impaired, limited or restricted  Goal/ : CK = at least 40% but < 60% impaired, limited or restricted        Assessment   Initial Assessment Pt presents to clinic s/p 1 day following labral repair and femoral neck osteoplasty with pain at rest and with activity, swelling, stiffness, decreased ROM/strength/functional status   Pt tolerated treatment well with no increase in pain and good understanding of hip precautions and HEP.    Rehab Potiential: good      History  Co-morbidities and personal factors that may impact the plan of care Examination  Body Structures and Functions, activity limitations and participation restrictions that may impact the plan of care    Clinical Presentation   Co-morbidities:   none        Personal Factors:   no deficits Body Regions:   lower extremities    Body Systems:    ROM  strength  gross coordinated movement  balance  gait  transfers  scar formation            Participation Restrictions:   See hip ROM precautions     Activity limitations:   Learning and applying knowledge  no deficits    General Tasks and Commands  no deficits    Communication  no deficits    Mobility  lifting and carrying objects  walking  transfers and stairs    Self care  toileting    Domestic Life  shopping  cooking  doing house work (cleaning house, washing dishes, laundry)    Interactions/Relationships  no deficits    Life Areas  no deficits    Community and Social Life  no deficits         stable and uncomplicated                      low   low  low Decision Making/ Complexity Score:  low     Short Term Goals ( 4-6 Weeks):   - Decrease c/o pain at rest and with activity 50%   - Pt will increase ROM to 50% of CL hip   - Pt will ambulate independently without an AD  - Pt independent with HEP with progressions  - Able to wash face over sink without increase in hip pain      Long Term Goals ( 16-24 Weeks):  - Decrease c/o pain 100%  - Ascend/Descend 10 steps without AD without increase in hip pain/sxs  - Pt will increase % of CL limb for improved ability to perform ADLs and exercise  - Pt will increase strength of L hip EXT and ABD to 4+/5 for improved stability and motor control of the hip  - Pt to return to 100% full function        Plan     Pt will be seen  1-2 time per week for  16-24 weeks. Recommended Treatment Plan will include:   AAROM/PROM exercise  Manual soft tissue and/or joint mobilization  Therapeutic Exercise    Individualized Home Exercise Program  Modalities   Pt education    Therapist: Shay Schmitz, SPT   I certify that I was present in the room directing the student in service delivery and guiding them using my skilled judgment.  As the co-signing therapist I have reviewed the students documentation and am responsible for the treatment, assessment, and plan.    Olivier Rand, PT   3/14/2018    I CERTIFY THE NEED FOR THESE SERVICES FURNISHED UNDER THIS PLAN OF TREATMENT AND WHILE UNDER MY CARE    Physician's comments: ________________________________________________________________________________________________________________________________________________    Physician's Name: ___________________________________

## 2018-03-15 NOTE — PLAN OF CARE
[facility]   PATIENT EVALUATION    Date of Evaluation: 03/14/2018  Visit #: 1 of 20 end 12/31/18    Start Time: 1100  Stop Time: 1200  Total treatment time: 60'    History     Onset Date: 2 year hx of insidious L hip pain   Date of Surgery: 3/13/18  1. Hip arthroscopic labral repair, knotless FiberTak (CPT 80173)  2. Hip arthroscopic femoral neck osteoplasty (CPT 74113)  3. Hip arthroscopic partial synovectomy/debridement (CPT 83001)  4. Hip arthroscopic capsular closure  5. Hip arthroscopic-assisted arthrocentesis of viable structural human allograft tissue      matrix (BioDRestore, supralabral recess) (CPT 65492)  6. Hip fluoroscopic guidance for needle placement/localization (CPT 01805)  Primary Diagnosis:    1. Acute hip pain, left       Treatment Diagnosis:   Past Medical History:   Diagnosis Date    Abnormal Pap smear of cervix 2010     Precautions: ROM limitations: FLEXION < 90 deg x 10 days, EXT 0 deg x 3 wks, ER 0 deg x 3 wks, IR no limits; weight bearing restriction 20# x 3 wks, brace 0-90 deg x 10 days, boot at night x 14 days  Diagnostic Tests: see chart, MRI (+) for labral tear  Prior Level of Function: Independent  Sports/Recreational Activities: running, occasional workout  Patient Therapy Goals: return to prior level of activity pain free    Subjective     AMH:  Sveta Saez states her L hip started hurting a few years ago and pain/discomfort progressively increased to significant pain with activity and heavy ADLs suck as bending and lifting. Patient had to stop exercising due to pain and reported 6/10 constant pain in L hip prior to surgery, pain would occasionally wake patient from sleep at night. Patient had arthroscopic hip surgery 3/13/18 for labral repair and femoral neck osteoplasty with Dr. Summers. Patient reports her hip is feeling OK but has minor aches and pain. Patient is employed in an IT department.    Pain:  Location: L Hip   Description:  "post-surgical soreness and pain  Activities Which Increase Pain: weight bearing, A/PROM activities beyond prescribed limits, movement  Activities Which Decrease Pain: pain medication, ice and rest  Pain Scale: 2/10 at best 2/10 now  Not Stated/10 at worst    Objective     Observation: unable to inspect incision and surrounding skin of L hip due to surgical bandages. Patient does not have overt atrophy of the leg musculature at this point.  Gait: With AD.  Device Used -  Axillary crutches Assistance Mod I  Passive Range of motion   Right    Left - NT d/t post-op patient       Strength R / L - NT d/t post-op patient    Palpation: unable to palpate directly due to surgical bandaging  Special Tests: N/A  Treatment:  Patient and  educated on HEP including:  Ankle pumps 5 min 3-4 times/day, quad sets 20 reps 1-2 times/day, hamstring sets 20 reps 1-2 times/day, and transversus abdominus isometrics 10:10 1-2 times/day  PROM (to be performed by  at home) supine hip IR log roll 20 reps 1-2 times/day, supine hip circumduction at ~20 deg flexion 20 reps CW, 20 rep CCW 1-2 times/day  Ascend/descend 4 6" steps with L handrail and axillary crutch  Patient and  educated on surgical procedure, ROM precautions x 3 weeks, limit weight bearing to 20# on LLE x 3 weeks, and transition to lying prone 5-7min throughout day to relieve pressure on butt and back  CP x 10'    HEP PROM was filmed on patient phone for future reference    Functional Limitations Reports - G Codes  Category: mobility  Tool: FOTO    Score:  29/80  Goal: 57/80    TEST SCORE  Modifier  Impairment Limitation Restriction    80/80  CH  0 % impaired, limited or restricted   64-79/80  CI  @ least 1% but less than 20% impaired, limited or restricted   49-63/80  CJ  @ least 20%<40% impaired, limited or restricted   33-48/80  CK  @ least 40%<60% impaired, limited or restricted   17-32/80  CL  @ least 60% <80% impaired, limited or restricted   1-16/80  CM  " @ least 80%<100% impaired limited or restricted   0/80  CN  100% impaired, limited or restricted     Current/ CL = least 60% but < 80% impaired, limited or restricted  Goal/ : CK = at least 40% but < 60% impaired, limited or restricted        Assessment   Initial Assessment Pt presents to clinic s/p 1 day following labral repair and femoral neck osteoplasty with pain at rest and with activity, swelling, stiffness, decreased ROM/strength/functional status   Pt tolerated treatment well with no increase in pain and good understanding of hip precautions and HEP.    Rehab Potiential: good      History  Co-morbidities and personal factors that may impact the plan of care Examination  Body Structures and Functions, activity limitations and participation restrictions that may impact the plan of care    Clinical Presentation   Co-morbidities:   none        Personal Factors:   no deficits Body Regions:   lower extremities    Body Systems:    ROM  strength  gross coordinated movement  balance  gait  transfers  scar formation            Participation Restrictions:   See hip ROM precautions     Activity limitations:   Learning and applying knowledge  no deficits    General Tasks and Commands  no deficits    Communication  no deficits    Mobility  lifting and carrying objects  walking  transfers and stairs    Self care  toileting    Domestic Life  shopping  cooking  doing house work (cleaning house, washing dishes, laundry)    Interactions/Relationships  no deficits    Life Areas  no deficits    Community and Social Life  no deficits         stable and uncomplicated                      low   low  low Decision Making/ Complexity Score:  low     Short Term Goals ( 4-6 Weeks):   - Decrease c/o pain at rest and with activity 50%   - Pt will increase ROM to 50% of CL hip   - Pt will ambulate independently without an AD  - Pt independent with HEP with progressions  - Able to wash face over sink without increase in hip pain      Long Term Goals ( 16-24 Weeks):  - Decrease c/o pain 100%  - Ascend/Descend 10 steps without AD without increase in hip pain/sxs  - Pt will increase % of CL limb for improved ability to perform ADLs and exercise  - Pt will increase strength of L hip EXT and ABD to 4+/5 for improved stability and motor control of the hip  - Pt to return to 100% full function        Plan     Pt will be seen  1-2 time per week for  16-24 weeks. Recommended Treatment Plan will include:   AAROM/PROM exercise  Manual soft tissue and/or joint mobilization  Therapeutic Exercise    Individualized Home Exercise Program  Modalities   Pt education    Therapist: Shay Schmitz, SPT   I certify that I was present in the room directing the student in service delivery and guiding them using my skilled judgment.  As the co-signing therapist I have reviewed the students documentation and am responsible for the treatment, assessment, and plan.    Olivier Rand, PT   3/14/2018    I CERTIFY THE NEED FOR THESE SERVICES FURNISHED UNDER THIS PLAN OF TREATMENT AND WHILE UNDER MY CARE    Physician's comments: ________________________________________________________________________________________________________________________________________________    Physician's Name: ___________________________________

## 2018-03-21 ENCOUNTER — CLINICAL SUPPORT (OUTPATIENT)
Dept: REHABILITATION | Facility: HOSPITAL | Age: 33
End: 2018-03-21
Payer: COMMERCIAL

## 2018-03-21 DIAGNOSIS — M25.552 ACUTE HIP PAIN, LEFT: ICD-10-CM

## 2018-03-21 PROCEDURE — 97110 THERAPEUTIC EXERCISES: CPT | Performed by: PHYSICAL THERAPIST

## 2018-03-21 NOTE — PROGRESS NOTES
Physical Therapy Daily Note     Date: 03/21/2018  Name: Sveta Saez  Clinic Number: [id number]  Diagnosis: Left hip pain   Physician: Vanessa Montes PA-C    Evaluation Date: 3/14/2018  Date of Surgery: 3/13/2018  1. Hip arthroscopic labral repair, knotless FiberTak (CPT 36857)  2. Hip arthroscopic femoral neck osteoplasty (CPT 20564)  3. Hip arthroscopic partial synovectomy/debridement (CPT 93153)  4. Hip arthroscopic capsular closure  5. Hip arthroscopic-assisted arthrocentesis of viable structural human allograft tissue      matrix (BioDRestore, supralabral recess) (CPT 89777)  6. Hip fluoroscopic guidance for needle placement/localization (CPT 16147)  Return to MD Date: 3/28/2018  Visit #: 2 of 20 ending 12/31/2018  Start Time: 1015  Stop Time:  1115  Total Treatment Time: 60    Precautions: ROM limitations: FLEXION < 90 deg x 10 days, EXT 0 deg x 3 wks, ER 0 deg x 3 wks, IR no limits; weight bearing restriction 20# x 3 wks, brace 0-90 deg x 10 days, boot at night x 14 days, no water activities until incision closure    Subjective     Pt reports her hip is feeling much better and has been compliant with HEP    Pain: 0/10    Objective     Measurements taken:  Pt and pt's  I in HEP    Sveta received therapeutic exercises to develop strength, endurance and ROM for 45 minutes including:  PROM: supine hip IR log roll x 20 reps, supine hip circumduction 20x CW, 20x CCW  Quad sets 2x20  Hamstring sets 2x20  Glute Isometric 2x20  Posterior Pelvic Tilt 2x20  Transversus abdominus isometrics 2x10:10  Isometric hip ABD with pilates ring 2x20  Isometric hip ADD with ball 2x20  Bike FW and BW 10' at lvl 3.0 with brace    Written Home Exercises Provided: Updated for posterior pelvic tilts, glute squeezes, and isometric hip ABD and ADD.   Pt demo good understanding of the education provided. Sveta demonstrated good return demonstration of activities.     Education  provided:  Pt educated on avoiding water activities until the incision has fully healed and HEP progression. Continue to follow ROM and WB precautions. Pt also educated on benefits of using a stationary bike for hip health.  Sveta verbalized good understanding of education provided.   No spiritual or educational barriers to learning identified.    Assessment   Pt demonstrated improved mobility with axillary crutches today and demonstrated good retention of HEP. Patient tolerated new exercises well with no increase in hip pain. Hip movement smooth within permitted ROM.  This is a 33 y.o. female referred to outpatient physical therapy and presents with a medical diagnosis of Acute hip pain, left s/p hip labral repair with osteoplasty and demonstrates limitations as described in the problem list Pt prognosis is Good. Pt will continue to benefit from skilled outpatient physical therapy to address the deficits listed below in the problem list, provide pt/family education and to maximize pt's level of independence in the home and community environment.    Will the patient continue to benefit from skilled PT intervention? Yes        Medical necessity is demonstrated by:   - Pain limits function of effected part for all activities  - Unable to participate in daily activities   - Requires skilled supervision to complete and progress HEP  - Fall risk - impaired balance   - Continued inability to participate in vocational pursuits    Progress towards goals: good    Short Term Goals ( 4-6 Weeks):   - Decrease c/o pain at rest and with activity 50%   - Pt will increase ROM to 50% of CL hip   - Pt will ambulate independently without an AD  - Pt independent with HEP with progressions  - Able to wash face over sink without increase in hip pain      Long Term Goals ( 16-24 Weeks):  - Decrease c/o pain 100%  - Ascend/Descend 10 steps without AD without increase in hip pain/sxs  - Pt will increase % of CL limb for improved  ability to perform ADLs and exercise  - Pt will increase strength of L hip EXT and ABD to 4+/5 for improved stability and motor control of the hip  - Pt to return to 100% full function         Plan   Continue with established Plan of Care towards PT goals with focus on decreasing pain, increasing ROM, strength, neuromuscular control and functional status   Therapist: Shay Schmitz, SPT  I certify that I was present in the room directing the student in service delivery and guiding them using my skilled judgment.  As the co-signing therapist I have reviewed the students documentation and am responsible for the treatment, assessment, and plan.    Olivier Rand, PT  3/21/2018

## 2018-03-28 ENCOUNTER — CLINICAL SUPPORT (OUTPATIENT)
Dept: REHABILITATION | Facility: HOSPITAL | Age: 33
End: 2018-03-28
Payer: COMMERCIAL

## 2018-03-28 ENCOUNTER — OFFICE VISIT (OUTPATIENT)
Dept: SPORTS MEDICINE | Facility: CLINIC | Age: 33
End: 2018-03-28
Payer: COMMERCIAL

## 2018-03-28 VITALS
DIASTOLIC BLOOD PRESSURE: 62 MMHG | HEART RATE: 72 BPM | WEIGHT: 130 LBS | SYSTOLIC BLOOD PRESSURE: 106 MMHG | HEIGHT: 64 IN | BODY MASS INDEX: 22.2 KG/M2

## 2018-03-28 DIAGNOSIS — S73.192D TEAR OF LEFT ACETABULAR LABRUM, SUBSEQUENT ENCOUNTER: ICD-10-CM

## 2018-03-28 DIAGNOSIS — Z09 S/P ORTHOPEDIC SURGERY, FOLLOW-UP EXAM: Primary | ICD-10-CM

## 2018-03-28 DIAGNOSIS — M25.552 ACUTE HIP PAIN, LEFT: ICD-10-CM

## 2018-03-28 PROCEDURE — 99024 POSTOP FOLLOW-UP VISIT: CPT | Mod: S$GLB,,, | Performed by: PHYSICIAN ASSISTANT

## 2018-03-28 PROCEDURE — 99999 PR PBB SHADOW E&M-EST. PATIENT-LVL III: CPT | Mod: PBBFAC,,, | Performed by: PHYSICIAN ASSISTANT

## 2018-03-28 PROCEDURE — 97110 THERAPEUTIC EXERCISES: CPT | Performed by: PHYSICAL THERAPIST

## 2018-03-28 NOTE — LETTER
Patient: Sveta Saez   YOB: 1985   Clinic Number: [id number]   Today's Date: March 28, 2018        Certificate to Return to Work     Sveta Saez was seen by Sawyer Landaverde PA-C on 3/28/2018.      Sveta can return to work on 04/02/2017    If you have any questions or concerns, please feel free to contact the office at [phone].    Thank you.    CYNDY Rust MA    Signature: __________________________________________________

## 2018-03-28 NOTE — PROGRESS NOTES
She is seeing us in first postop visit after;    Overall, pain is well controlled and is quite happy.     He has progressed nicely in PT.     Today the patient rates pain at a 1/10 on visual analog scale.       Feels % better vs prior to surgery    DATE OF PROCEDURE: 3/13/2018     SURGEON:  Marivel Summers M.D.     PROCEDURE:    Left:  1. Hip arthroscopic labral repair, knotless FiberTak (CPT 08543)  2. Hip arthroscopic femoral neck osteoplasty (CPT 34135)  3. Hip arthroscopic partial synovectomy/debridement (CPT 36783)  4. Hip arthroscopic capsular closure  5. Hip arthroscopic-assisted arthrocentesis of viable structural human allograft tissue      matrix (BioDRestore, supralabral recess) (CPT 67215)  6. Hip fluoroscopic guidance for needle placement/localization (CPT 56111)      PHYSICAL EXAM / HIP  PHYSICAL EXAMINATION  General:  The patient is alert and oriented x 3.  Mood is pleasant.  Observation of ears, eyes and nose reveal no gross abnormalities.  No labored breathing observed.    left HIP EXAMINATION     OBSERVATION / INSPECTION  Gait:   Nonantalgic   Alignment:  Neutral   Scars:   None   Muscle atrophy: None   Effusion:  None   Warmth:  None   Discoloration:   None   Leg lengths:   Equal   Pelvis:   Level     Incision sites healed well. Sutures removed today with no issues. No wound infection, wound dehiscence, or wound drainage noted today. Area cleaned before and after with alcohol gauze pads.     Minimal effusion  No swelling, no calf tenderness                                    TENDERNESS / CREPITUS (T/C):      T / C  Trochanteric bursa   - / -  Piriformis    - / -  SI joint    - / -  Psoas tendon   - / -  Rectus insertion  - / -  Adductor insertion  - / -  Pubic symphysis  - / -    ROM: (* = pain)    Flexion:    120 degrees  External rotation: 40 degrees  Internal rotation: 30 degrees  Abduction:  45 degrees  Adduction:   20 degrees    EXTREMITY NEURO-VASCULAR EXAMINATION:   Sensation:  Grossly intact  to light touch all dermatomal regions.   Motor Function:  Fully intact motor function at hip, knee, foot and ankle    DTRs;  quadriceps and  achilles 2+.  No clonus and downgoing Babinski.    Vascular status:  DP and PT pulses 2+, brisk capillary refill, symmetric.    Skin:  intact, compartments soft.    OTHER FINDINGS:  - Kevin's sign    ASSESSMENT:  Status post above, doing well.     PLAN:                                                                                                                                   1. Continue with PT.   2. Patient should continue crutches for another 1 week and d/c brace.  3. I have discussed return to activity in detail.  4.Patient will see us back at 6 week post-op ana.                                    5. All questions were answered and patient should contact us if she  has any questions or concerns in the interim.

## 2018-03-28 NOTE — PROGRESS NOTES
Physical Therapy Daily Note     Date: 03/28/2018  Name: Sveta Saez  Clinic Number: [id number]  Diagnosis: Left hip pain   Physician: Vanessa Montes PA-C    Evaluation Date: 3/14/2018  Date of Surgery: 3/13/2018  1. Hip arthroscopic labral repair, knotless FiberTak (CPT 18762)  2. Hip arthroscopic femoral neck osteoplasty (CPT 80827)  3. Hip arthroscopic partial synovectomy/debridement (CPT 51745)  4. Hip arthroscopic capsular closure  5. Hip arthroscopic-assisted arthrocentesis of viable structural human allograft tissue      matrix (BioDRestore, supralabral recess) (CPT 47524)  6. Hip fluoroscopic guidance for needle placement/localization (CPT 78300)  Return to MD Date: 3/28/2018  Visit #: 3 of 20 ending 12/31/2018  Start Time: 0840  Stop Time:  0940  Total Treatment Time: 60'    Precautions: ROM limitations: FLEXION < 90 deg x 10 days, EXT 0 deg x 3 wks, ER 0 deg x 3 wks, IR no limits; weight bearing restriction 20# x 3 wks, brace 0-90 deg x 10 days, boot at night x 14 days, no water activities until incision closure    Subjective     Patient was seen by CYNDY today and brace and boot were discontinued and stitches removed. Patient also reports she is getting a stationary bike today from family friends. Patient reports she was a little sore from walking a few days ago but discomfort cleared after using ice. Pt returning to work Monday 4/2.     Pain: 0/10    Objective   POD: 15  Measurements taken:   Sveta received therapeutic exercises to develop strength, endurance and ROM for 45 minutes including:  PROM: supine hip flexion and IR log roll x 20 reps, supine hip circumduction 20x CW, 20x CCW  Bike FW/BW 12' at lvl 3.5  Quadruped rocking 2x20  Quadruped cat/camel 2x20  Quadruped TA activation 2x10:10  Quad sets 1x20  Glute Isometric 1x20  Isometric hip ADD with ball 2x20  Isometric hip ABD hold in S/L 1x10:03  Prone IR MR 1x20 mid range  Prone ER MR 1x20 mid  range  Unaffected knee (R) to chest [id number]  Standing IR on stool 2x20  Cp x 10'     Written Home Exercises Provided: Updated for quadruped exercises and decreased frequency of ROM activities to 1x/day  Pt demo good understanding of the education provided. Sveta demonstrated good return demonstration of activities.     Education provided:  Pt educated on avoiding water activities until the incision has fully healed and HEP progression. Continue to follow ROM and WB precautions. Pt also educated on benefits of using a stationary bike for hip health.  Sveta verbalized good understanding of education provided.   No spiritual or educational barriers to learning identified.    Assessment   Patient tolerated session well with no increase in hip pain. Patient is making good progress towards goals. Hip ROM improving and patient able to tolerate weight bearing in quadruped and more advanced strengthening with no complaints.   This is a 33 y.o. female referred to outpatient physical therapy and presents with a medical diagnosis of Acute hip pain, left s/p hip labral repair with osteoplasty and demonstrates limitations as described in the problem list Pt prognosis is Good. Pt will continue to benefit from skilled outpatient physical therapy to address the deficits listed below in the problem list, provide pt/family education and to maximize pt's level of independence in the home and community environment.    Will the patient continue to benefit from skilled PT intervention? Yes        Medical necessity is demonstrated by:   - Pain limits function of effected part for all activities  - Unable to participate in daily activities   - Requires skilled supervision to complete and progress HEP  - Fall risk - impaired balance   - Continued inability to participate in vocational pursuits    Progress towards goals: good    Short Term Goals ( 4-6 Weeks):   - Decrease c/o pain at rest and with activity 50%   - Pt will increase ROM to 50%  of CL hip   - Pt will ambulate independently without an AD  - Pt independent with HEP with progressions  - Able to wash face over sink without increase in hip pain      Long Term Goals ( 16-24 Weeks):  - Decrease c/o pain 100%  - Ascend/Descend 10 steps without AD without increase in hip pain/sxs  - Pt will increase % of CL limb for improved ability to perform ADLs and exercise  - Pt will increase strength of L hip EXT and ABD to 4+/5 for improved stability and motor control of the hip  - Pt to return to 100% full function         Plan   Continue with established Plan of Care towards PT goals with focus on decreasing pain, increasing ROM, strength, neuromuscular control and functional status   Therapist: Shay Schmitz, SPT  I certify that I was present in the room directing the student in service delivery and guiding them using my skilled judgment.  As the co-signing therapist I have reviewed the students documentation and am responsible for the treatment, assessment, and plan.    Olivier Rand, PT  3/28/2018

## 2018-03-30 ENCOUNTER — PATIENT MESSAGE (OUTPATIENT)
Dept: SPORTS MEDICINE | Facility: CLINIC | Age: 33
End: 2018-03-30

## 2018-04-04 ENCOUNTER — CLINICAL SUPPORT (OUTPATIENT)
Dept: REHABILITATION | Facility: HOSPITAL | Age: 33
End: 2018-04-04
Payer: COMMERCIAL

## 2018-04-04 DIAGNOSIS — M25.552 ACUTE HIP PAIN, LEFT: ICD-10-CM

## 2018-04-04 PROCEDURE — 97110 THERAPEUTIC EXERCISES: CPT | Performed by: PHYSICAL THERAPIST

## 2018-04-04 NOTE — PROGRESS NOTES
Physical Therapy Daily Note     Date: 04/04/2018  Name: Sveta Saez  Clinic Number: [id number]  Diagnosis: Left hip pain   Physician: Vanessa Montes PA-C    Evaluation Date: 3/14/2018  Date of Surgery: 3/13/2018  1. Hip arthroscopic labral repair, knotless FiberTak (CPT 77216)  2. Hip arthroscopic femoral neck osteoplasty (CPT 62620)  3. Hip arthroscopic partial synovectomy/debridement (CPT 87559)  4. Hip arthroscopic capsular closure  5. Hip arthroscopic-assisted arthrocentesis of viable structural human allograft tissue      matrix (BioDRestore, supralabral recess) (CPT 34246)  6. Hip fluoroscopic guidance for needle placement/localization (CPT 93986)  Return to MD Date: 3/28/2018  Visit #: 3 of 20 ending 12/31/2018  Start Time: 0730  Stop Time:  0830  Total Treatment Time: 60'    Precautions: ROM limitations: FLEXION < 90 deg x 10 days, EXT 0 deg x 3 wks, ER 0 deg x 3 wks, IR no limits; weight bearing restriction 20# x 3 wks, brace 0-90 deg x 10 days, boot at night x 14 days, no water activities until incision closure    Subjective     Patient reports her hip feels good but is back at work and hip tends to stiffen up sitting. Says she walks around to loosen up.     Pain: 0/10    Objective   POD: 22  Measurements taken: none   Sveta received therapeutic exercises to develop strength, endurance and ROM for 45 minutes including:  PROM - ER/flexion/ABD to tolerance  Bike FW/BW 15' at lvl 3.0  Gait training FWB with bilat axillary crutches   Quadruped rocking 2x20  Quadruped cat/camel 2x20  Quadruped TA activation 2x10:10  Prone IR MR 3x15 mid range  Prone ER MR 3x15 mid range  Isometric hip ADD with ball 1x15:03  Isometric hip ABD hold in S/L 1x15:03  Standing IR on stool 2x20  CP x 10'     Written Home Exercises Provided:  TA activation quadruped  Pt demo good understanding of the education provided. Sveta demonstrated good return demonstration of activities.      Education provided: Begin WBAT but continue to use crutches for walking until able to walk without limp or unsteadies.   Pt educated on avoiding water activities until the incision has fully healed and HEP progression. Continue to follow ROM and WB precautions. Pt also educated on benefits of using a stationary bike for hip health.  Sveta verbalized good understanding of education provided.   No spiritual or educational barriers to learning identified.    Assessment   Patient tolerated session well. Increased WB status to WBAT and initiated gait training. Pt ambulate with decr stance time on LLE leading to decr step length on RLE but demonstrated within session improvement of gait.    This is a 33 y.o. female referred to outpatient physical therapy and presents with a medical diagnosis of Acute hip pain, left s/p hip labral repair with osteoplasty and demonstrates limitations as described in the problem list Pt prognosis is Good. Pt will continue to benefit from skilled outpatient physical therapy to address the deficits listed below in the problem list, provide pt/family education and to maximize pt's level of independence in the home and community environment.    Will the patient continue to benefit from skilled PT intervention? Yes        Medical necessity is demonstrated by:   - Pain limits function of effected part for all activities  - Unable to participate in daily activities   - Requires skilled supervision to complete and progress HEP  - Fall risk - impaired balance   - Continued inability to participate in vocational pursuits    Progress towards goals: good    Short Term Goals ( 4-6 Weeks):   - Decrease c/o pain at rest and with activity 50%   - Pt will increase ROM to 50% of CL hip   - Pt will ambulate independently without an AD  - Pt independent with HEP with progressions  - Able to wash face over sink without increase in hip pain      Long Term Goals ( 16-24 Weeks):  - Decrease c/o pain 100%  -  Ascend/Descend 10 steps without AD without increase in hip pain/sxs  - Pt will increase % of CL limb for improved ability to perform ADLs and exercise  - Pt will increase strength of L hip EXT and ABD to 4+/5 for improved stability and motor control of the hip  - Pt to return to 100% full function         Plan   Continue with established Plan of Care towards PT goals with focus on decreasing pain, increasing ROM, strength, neuromuscular control and functional status   Therapist: Shay Schmitz, SPT  I certify that I was present in the room directing the student in service delivery and guiding them using my skilled judgment.  As the co-signing therapist I have reviewed the students documentation and am responsible for the treatment, assessment, and plan.    Olivier Rand, PT  4/4/2018

## 2018-04-09 ENCOUNTER — TELEPHONE (OUTPATIENT)
Dept: SPORTS MEDICINE | Facility: CLINIC | Age: 33
End: 2018-04-09

## 2018-04-09 NOTE — TELEPHONE ENCOUNTER
Received [facility] paperwork to complete for the patient. She needs intermittent leave to attend PT appt and f/u appt. Paperwork was completed and faxed to [employer] at [phone].    [facility]

## 2018-04-11 ENCOUNTER — CLINICAL SUPPORT (OUTPATIENT)
Dept: REHABILITATION | Facility: HOSPITAL | Age: 33
End: 2018-04-11
Payer: COMMERCIAL

## 2018-04-11 DIAGNOSIS — M25.552 ACUTE HIP PAIN, LEFT: ICD-10-CM

## 2018-04-11 PROCEDURE — 97110 THERAPEUTIC EXERCISES: CPT | Performed by: PHYSICAL THERAPIST

## 2018-04-11 NOTE — PROGRESS NOTES
Physical Therapy Daily Note     Date: 04/11/2018  Name: Sveta Saez  Clinic Number: [id number]  Diagnosis: Left hip pain   Physician: Vanessa Montes PA-C    Evaluation Date: 3/14/2018  Date of Surgery: 3/13/2018  1. Hip arthroscopic labral repair, knotless FiberTak (CPT 52006)  2. Hip arthroscopic femoral neck osteoplasty (CPT 51093)  3. Hip arthroscopic partial synovectomy/debridement (CPT 56091)  4. Hip arthroscopic capsular closure  5. Hip arthroscopic-assisted arthrocentesis of viable structural human allograft tissue      matrix (BioDRestore, supralabral recess) (CPT 44407)  6. Hip fluoroscopic guidance for needle placement/localization (CPT 75006)  Return to MD Date: 3/28/2018  Visit #: 4 of 20 ending 12/31/2018  Start Time: 0730  Stop Time:  0830  Total Treatment Time: 60'    Precautions: ROM limitations: FLEXION < 90 deg x 10 days, EXT 0 deg x 3 wks, ER 0 deg x 3 wks, IR no limits; weight bearing restriction 20# x 3 wks, brace 0-90 deg x 10 days, boot at night x 14 days, no water activities until incision closure    Subjective     Patient reports her hip feels good but still uses crutches to walk.     Pain: 0/10    Objective   POD: 29  Measurements taken: 110 deg hip flexion in quad rocking  Sveta received therapeutic exercises to develop strength, endurance and ROM for 45 minutes including:  PROM - ER/flexion/ABD to tolerance  Bike FW/BW 10' at lvl 3.5  Gait training FWB with one axillary crutch  DL bridge 2x10:03  S/L hip ABD 2x10:03  Quadruped rocking 1x20  Quadruped cat/camel 1x20  Prone IR MR 2x10 mid range  Prone ER MR 2x10 mid range  Kneeling hip flexor stretch [id number]  Gait training: RLE cone step over 3 laps w/ Ad, 3 laps no AD  CP x 10'     Written Home Exercises Provided: updated for PRINCE montelongo  Pt demo good understanding of the education provided. Sveta demonstrated good return demonstration of activities.     Education provided: Begin household  ambulation without crutches, use for community ambulation  Pt educated on avoiding water activities until the incision has fully healed and HEP progression. Continue to follow ROM and WB precautions. Pt also educated on benefits of using a stationary bike for hip health.  Sveta verbalized good understanding of education provided.   No spiritual or educational barriers to learning identified.    Assessment   Pt demonstrates trendelenburg gait with decreased hip extension, within session improvement seen with gait. Good training effect in glutes today.   This is a 33 y.o. female referred to outpatient physical therapy and presents with a medical diagnosis of Acute hip pain, left s/p hip labral repair with osteoplasty and demonstrates limitations as described in the problem list Pt prognosis is Good. Pt will continue to benefit from skilled outpatient physical therapy to address the deficits listed below in the problem list, provide pt/family education and to maximize pt's level of independence in the home and community environment.    Will the patient continue to benefit from skilled PT intervention? Yes        Medical necessity is demonstrated by:   - Pain limits function of effected part for all activities  - Unable to participate in daily activities   - Requires skilled supervision to complete and progress HEP  - Fall risk - impaired balance   - Continued inability to participate in vocational pursuits    Progress towards goals: good    Short Term Goals ( 4-6 Weeks):   - Decrease c/o pain at rest and with activity 50%   - Pt will increase ROM to 50% of CL hip   - Pt will ambulate independently without an AD  - Pt independent with HEP with progressions  - Able to wash face over sink without increase in hip pain      Long Term Goals ( 16-24 Weeks):  - Decrease c/o pain 100%  - Ascend/Descend 10 steps without AD without increase in hip pain/sxs  - Pt will increase % of CL limb for improved ability to perform  ADLs and exercise  - Pt will increase strength of L hip EXT and ABD to 4+/5 for improved stability and motor control of the hip  - Pt to return to 100% full function         Plan   Continue with established Plan of Care towards PT goals with focus on decreasing pain, increasing ROM, strength, neuromuscular control and functional status   Therapist: Shay Schmitz, SPT  I certify that I was present in the room directing the student in service delivery and guiding them using my skilled judgment.  As the co-signing therapist I have reviewed the students documentation and am responsible for the treatment, assessment, and plan.    Olivier Rand, PT  4/11/2018

## 2018-04-18 ENCOUNTER — CLINICAL SUPPORT (OUTPATIENT)
Dept: REHABILITATION | Facility: HOSPITAL | Age: 33
End: 2018-04-18
Payer: COMMERCIAL

## 2018-04-18 DIAGNOSIS — M25.552 ACUTE HIP PAIN, LEFT: ICD-10-CM

## 2018-04-18 PROCEDURE — 97110 THERAPEUTIC EXERCISES: CPT | Performed by: PHYSICAL THERAPIST

## 2018-04-18 NOTE — PROGRESS NOTES
Physical Therapy Daily Note     Date: 04/18/2018  Name: Sveta Saez  Clinic Number: [id number]  Diagnosis: Left hip pain   Physician: Vanessa Montes PA-C    Evaluation Date: 3/14/2018  Date of Surgery: 3/13/2018  1. Hip arthroscopic labral repair, knotless FiberTak (CPT 18271)  2. Hip arthroscopic femoral neck osteoplasty (CPT 50892)  3. Hip arthroscopic partial synovectomy/debridement (CPT 03414)  4. Hip arthroscopic capsular closure  5. Hip arthroscopic-assisted arthrocentesis of viable structural human allograft tissue      matrix (BioDRestore, supralabral recess) (CPT 05217)  6. Hip fluoroscopic guidance for needle placement/localization (CPT 66548)  Return to MD Date: 3/28/2018  Visit #: 5 of 20 ending 12/31/2018  Start Time: 0720  Stop Time:  0820  Total Treatment Time: 60'    Precautions: ROM limitations: FLEXION < 90 deg x 10 days, EXT 0 deg x 3 wks, ER 0 deg x 3 wks, IR no limits; weight bearing restriction 20# x 3 wks, brace 0-90 deg x 10 days, boot at night x 14 days, no water activities until incision closure    Subjective     Patient reports hip was sore and tired after trying to walk to lunch on Monday without crutches, weaned from crutches on Sunday. States hip feels OK today.      Pain: 0/10 at rest    Objective   POD: 29  Measurements taken: limited ER and ABD, improved gait pattern but still with slight deviation in CL step length, willie, and foot clearance d/t hip flexion.  Sveta received therapeutic exercises to develop strength, endurance and ROM for 45 minutes including:    PROM - ER/flexion/ABD to tolerance  Bike FW/BW 10' at lvl 3.5  Kneeling hip flexor stretch 5d56ths  DL bridge with core activation 2x10:03  S/L hip ABD 2x10:03  S/L clamshell 2x10:03  Prone IR/ER  2x10 mid range MR  PLR 2x10:03  Shuttle DL LP 1.0b 3x10  Stool IR/ER 2x20  CP x 10'     Written Home Exercises Provided: updated for DL bridge and hip flexor stretch  Pt demo  good understanding of the education provided. Sveta demonstrated good return demonstration of activities.     Education provided:   Pt educated on avoiding water activities until the incision has fully healed and HEP progression. Continue to follow ROM and WB precautions. Pt also educated on benefits of using a stationary bike for hip health.  Sveta verbalized good understanding of education provided.   No spiritual or educational barriers to learning identified.    Assessment   Pt tolerated treatment well and making good progress towards goals. Pt demonstrates improved gait without AD today and able to perform all TE without increase in hip discomfort.  This is a 33 y.o. female referred to outpatient physical therapy and presents with a medical diagnosis of Acute hip pain, left s/p hip labral repair with osteoplasty and demonstrates limitations as described in the problem list Pt prognosis is Good. Pt will continue to benefit from skilled outpatient physical therapy to address the deficits listed below in the problem list, provide pt/family education and to maximize pt's level of independence in the home and community environment.    Will the patient continue to benefit from skilled PT intervention? Yes        Medical necessity is demonstrated by:   - Pain limits function of effected part for all activities  - Unable to participate in daily activities   - Requires skilled supervision to complete and progress HEP  - Fall risk - impaired balance   - Continued inability to participate in vocational pursuits    Progress towards goals: good    Short Term Goals ( 4-6 Weeks):   - Decrease c/o pain at rest and with activity 50%   - Pt will increase ROM to 50% of CL hip   - Pt will ambulate independently without an AD  - Pt independent with HEP with progressions   - Able to wash face over sink without increase in hip pain      Long Term Goals ( 16-24 Weeks):  - Decrease c/o pain 100%  - Ascend/Descend 10 steps without AD  without increase in hip pain/sxs  - Pt will increase % of CL limb for improved ability to perform ADLs and exercise  - Pt will increase strength of L hip EXT and ABD to 4+/5 for improved stability and motor control of the hip  - Pt to return to 100% full function         Plan   Begin SL activities as tolerated and light hip flexion AROM in upcoming sessions.   Continue with established Plan of Care towards PT goals with focus on decreasing pain, increasing ROM, strength, neuromuscular control and functional status   Therapist: Shay Schmitz, SPT  I certify that I was present in the room directing the student in service delivery and guiding them using my skilled judgment.  As the co-signing therapist I have reviewed the students documentation and am responsible for the treatment, assessment, and plan.    Olivier Rand, PT  4/18/2018

## 2018-04-25 ENCOUNTER — OFFICE VISIT (OUTPATIENT)
Dept: SPORTS MEDICINE | Facility: CLINIC | Age: 33
End: 2018-04-25
Payer: COMMERCIAL

## 2018-04-25 ENCOUNTER — CLINICAL SUPPORT (OUTPATIENT)
Dept: REHABILITATION | Facility: HOSPITAL | Age: 33
End: 2018-04-25
Payer: COMMERCIAL

## 2018-04-25 VITALS
DIASTOLIC BLOOD PRESSURE: 74 MMHG | HEART RATE: 76 BPM | SYSTOLIC BLOOD PRESSURE: 122 MMHG | WEIGHT: 130 LBS | BODY MASS INDEX: 22.2 KG/M2 | HEIGHT: 64 IN

## 2018-04-25 DIAGNOSIS — S73.192D TEAR OF LEFT ACETABULAR LABRUM, SUBSEQUENT ENCOUNTER: Primary | ICD-10-CM

## 2018-04-25 DIAGNOSIS — M25.552 ACUTE HIP PAIN, LEFT: ICD-10-CM

## 2018-04-25 PROCEDURE — 99024 POSTOP FOLLOW-UP VISIT: CPT | Mod: S$GLB,,, | Performed by: ORTHOPAEDIC SURGERY

## 2018-04-25 PROCEDURE — 99999 PR PBB SHADOW E&M-EST. PATIENT-LVL III: CPT | Mod: PBBFAC,,, | Performed by: ORTHOPAEDIC SURGERY

## 2018-04-25 PROCEDURE — 97110 THERAPEUTIC EXERCISES: CPT | Performed by: PHYSICAL THERAPIST

## 2018-04-25 NOTE — PROGRESS NOTES
She is here for post op visit.  Pt is attending PT at [place].  Reports slight pain with some feelings of tightness.      Feels 90% better vs prior to surgery    DATE OF PROCEDURE: 3/13/2018     SURGEON:  Marivel Summers M.D.     PROCEDURE:    Left:  1. Hip arthroscopic labral repair, knotless FiberTak (CPT 33993)  2. Hip arthroscopic femoral neck osteoplasty (CPT 92806)  3. Hip arthroscopic partial synovectomy/debridement (CPT 80819)  4. Hip arthroscopic capsular closure  5. Hip arthroscopic-assisted arthrocentesis of viable structural human allograft tissue      matrix (BioDRestore, supralabral recess) (CPT 96676)  6. Hip fluoroscopic guidance for needle placement/localization (CPT 57174)      PHYSICAL EXAM / HIP  PHYSICAL EXAMINATION  General:  The patient is alert and oriented x 3.  Mood is pleasant.  Observation of ears, eyes and nose reveal no gross abnormalities.  No labored breathing observed.    left HIP EXAMINATION     OBSERVATION / INSPECTION  Gait:   Nonantalgic   Alignment:  Neutral   Scars:   None   Muscle atrophy: None   Effusion:  None   Warmth:  None   Discoloration:   None   Leg lengths:   Equal   Pelvis:   Level     Incision sites healed well. Sutures removed today with no issues. No wound infection, wound dehiscence, or wound drainage noted today. Area cleaned before and after with alcohol gauze pads.     Minimal effusion  No swelling, no calf tenderness                                    TENDERNESS / CREPITUS (T/C):      T / C  Trochanteric bursa   - / -  Piriformis    - / -  SI joint    - / -  Psoas tendon   - / -  Rectus insertion  - / -  Adductor insertion  - / -  Pubic symphysis  - / -    ROM: (* = pain)    Flexion:    100 degrees  External rotation: 40 degrees  Internal rotation: 15 degrees  Abduction:  45 degrees  Adduction:   20 degrees    EXTREMITY NEURO-VASCULAR EXAMINATION:   Sensation:  Grossly intact to light touch all dermatomal regions.   Motor Function:  Fully intact motor function  at hip, knee, foot and ankle    DTRs;  quadriceps and  achilles 2+.  No clonus and downgoing Babinski.    Vascular status:  DP and PT pulses 2+, brisk capillary refill, symmetric.    Skin:  intact, compartments soft.    OTHER FINDINGS:  - Kevin's sign    ASSESSMENT:  Status post above, doing well.     PLAN:                                                                                                                                   1. Continue with PT.   2. Patient should continue crutches for another 1 week and d/c brace.  3. I have discussed return to activity in detail.  4.Patient will see us back at 3 mo post-op ana.                                    5. All questions were answered and patient should contact us if she  has any questions or concerns in the interim.

## 2018-04-25 NOTE — PROGRESS NOTES
Physical Therapy Daily Note     Date: 04/25/2018  Name: Sveta Saez  Clinic Number: [id number]  Diagnosis: Left hip pain   Physician: Vanessa Montes PA-C    Evaluation Date: 3/14/2018  Date of Surgery: 3/13/2018  1. Hip arthroscopic labral repair, knotless FiberTak (CPT 17744)  2. Hip arthroscopic femoral neck osteoplasty (CPT 72211)  3. Hip arthroscopic partial synovectomy/debridement (CPT 97993)  4. Hip arthroscopic capsular closure  5. Hip arthroscopic-assisted arthrocentesis of viable structural human allograft tissue      matrix (BioDRestore, supralabral recess) (CPT 44535)  6. Hip fluoroscopic guidance for needle placement/localization (CPT 82374)  Return to MD Date: 6 weeks from 4/25/2018  Visit #: 6 of 20 ending 12/31/2018  Start Time: 0915  Stop Time:  1015  Total Treatment Time: 60'    Precautions: ROM limitations: FLEXION < 90 deg x 10 days, EXT 0 deg x 3 wks, ER 0 deg x 3 wks, IR no limits; weight bearing restriction 20# x 3 wks, brace 0-90 deg x 10 days, boot at night x 14 days, no water activities until incision closure    Subjective     Patient reports hip was sore for a few days after attending her sister's wedding this weekend, says she was walking and on her feet a lot Friday thru Sunday, soreness dissipated by Tuesday. MD visit today and pt reports MD is pleased with progress.     Pain: 0/10 at rest    Objective   POD: 43  Measurements taken: tightness in piriformis, discomfort with CCW circumduction/FADIR, flexion > 90 deg  Sveta received therapeutic exercises to develop strength, endurance and ROM for 45 minutes including:    PROM - ER/flexion/ABD to tolerance  S/L piriformis stretch [id number]  Bike x 8' lvl 3.5  S/L hip ABD hold 2x10:06  S/L clamshell 2x15  S/L rev clamshells 2x15  S/L hip ER 3x10  DL bridge with core activation 3x10  Supine hip flexion with exercise ball 2x10  Quad hip ext 2x10  Kneeling hip thruster 3x10  CP x 10'     Written  Home Exercises Provided: updated for supine hip flexion with ball, reverse clamshell, quad hip ext, and hip thruster  Pt demo good understanding of the education provided. Sveta demonstrated good return demonstration of activities.     Education provided:   Pt educated on avoiding water activities until the incision has fully healed and HEP progression. Continue to follow ROM and WB precautions. Pt also educated on benefits of using a stationary bike for hip health.  Sveta verbalized good understanding of education provided.   No spiritual or educational barriers to learning identified.    Assessment   Pt demonstrated some weakness in the left hip but had good training effect today. Slight tightness noted in piriformis but improved ROM overall. Pt tolerated treatment well.   This is a 33 y.o. female referred to outpatient physical therapy and presents with a medical diagnosis of Acute hip pain, left s/p hip labral repair with osteoplasty and demonstrates limitations as described in the problem list Pt prognosis is Good. Pt will continue to benefit from skilled outpatient physical therapy to address the deficits listed below in the problem list, provide pt/family education and to maximize pt's level of independence in the home and community environment.    Will the patient continue to benefit from skilled PT intervention? Yes        Medical necessity is demonstrated by:   - Pain limits function of effected part for all activities  - Unable to participate in daily activities   - Requires skilled supervision to complete and progress HEP  - Fall risk - impaired balance   - Continued inability to participate in vocational pursuits    Progress towards goals: good    Short Term Goals ( 4-6 Weeks):   - Decrease c/o pain at rest and with activity 50%   - Pt will increase ROM to 50% of CL hip   - Pt will ambulate independently without an AD  - Pt independent with HEP with progressions   - Able to wash face over sink without  increase in hip pain      Long Term Goals ( 16-24 Weeks):  - Decrease c/o pain 100%  - Ascend/Descend 10 steps without AD without increase in hip pain/sxs  - Pt will increase % of CL limb for improved ability to perform ADLs and exercise  - Pt will increase strength of L hip EXT and ABD to 4+/5 for improved stability and motor control of the hip  - Pt to return to 100% full function         Plan    Continue with established Plan of Care towards PT goals with focus on decreasing pain, increasing ROM, strength, neuromuscular control and functional status   Therapist: Shay Schmitz, SPT  I certify that I was present in the room directing the student in service delivery and guiding them using my skilled judgment.  As the co-signing therapist I have reviewed the students documentation and am responsible for the treatment, assessment, and plan.    Olivier Rand, PT  4/25/2018

## 2018-05-02 ENCOUNTER — CLINICAL SUPPORT (OUTPATIENT)
Dept: REHABILITATION | Facility: HOSPITAL | Age: 33
End: 2018-05-02
Payer: COMMERCIAL

## 2018-05-02 DIAGNOSIS — M25.552 ACUTE HIP PAIN, LEFT: ICD-10-CM

## 2018-05-02 PROCEDURE — 97110 THERAPEUTIC EXERCISES: CPT | Performed by: PHYSICAL THERAPIST

## 2018-05-02 NOTE — PROGRESS NOTES
"                                                  Physical Therapy Daily Note     Date: 05/02/2018  Name: Sveta Saez  Clinic Number: [id number]  Diagnosis: Left hip pain   Physician: Vanessa Montes PA-C    Evaluation Date: 3/14/2018  Date of Surgery: 3/13/2018  1. Hip arthroscopic labral repair, knotless FiberTak (CPT 08432)  2. Hip arthroscopic femoral neck osteoplasty (CPT 05074)  3. Hip arthroscopic partial synovectomy/debridement (CPT 58957)  4. Hip arthroscopic capsular closure  5. Hip arthroscopic-assisted arthrocentesis of viable structural human allograft tissue      matrix (BioDRestore, supralabral recess) (CPT 93046)  6. Hip fluoroscopic guidance for needle placement/localization (CPT 00046)  Return to MD Date: 6 weeks from 4/25/2018  Visit #: 8 of 20 ending 12/31/2018  Start Time: 0730  Stop Time:  8:30  Total Treatment Time: 60'    Precautions: ROM limitations: FLEXION < 90 deg x 10 days, EXT 0 deg x 3 wks, ER 0 deg x 3 wks, IR no limits; weight bearing restriction 20# x 3 wks, brace 0-90 deg x 10 days, boot at night x 14 days, no water activities until incision closure    Subjective     Patient reports "My hip is a little sore this morning (anteirorly)" pt also notes being a little sore after LV but this resolved by the following day     Pain: 1/10 at rest    Objective   POD: 50  Measurements taken: tightness in piriformis, discomfort with CCW circumduction/FADIR, flexion > 90 deg  Sveta received therapeutic exercises to develop strength, endurance and ROM for 45 minutes including:  MH pre Rx x 10'   PROM - circumduction CW and CCW 1x20 each  Bike x 10' 5' FW and 5' BW  Gentle STM 'scope portals   Prone self hip flexor / RF stretch 5x;15  pilates ab 100s 3x10  Supine hip flex with red PB 2x10 + TA  R S/L hip ABD hold 1x10:06  R S/L clamshell 1x15  R S/L rev clamshells 1x15  L S/L hip ER 2x15   Quad rock 1x15  Quad cat/cow 1x15  Quad TA 1x10:10   Quad hip ext 2x5 on L only   Kneeling hip thruster " 1x15  CP x 10'     Written Home Exercises Provided: updated for supine hip flexion with ball, reverse clamshell, quad hip ext, and hip thruster  Pt demo good understanding of the education provided. Sveta demonstrated good return demonstration of activities.     Education provided:   Pt educated on avoiding water activities until the incision has fully healed and HEP progression. Continue to follow ROM and WB precautions. Pt also educated on benefits of using a stationary bike for hip health.  Sveta verbalized good understanding of education provided.   No spiritual or educational barriers to learning identified.    Assessment   Pt with no change in sxs post Rx, tolerated treatment well, improved exercise tolerance in terms of volume, motion and strength still limited in L hip affecting ADLs.   This is a 33 y.o. female referred to outpatient physical therapy and presents with a medical diagnosis of Acute hip pain, left s/p hip labral repair with osteoplasty and demonstrates limitations as described in the problem list Pt prognosis is Good. Pt will continue to benefit from skilled outpatient physical therapy to address the deficits listed below in the problem list, provide pt/family education and to maximize pt's level of independence in the home and community environment.    Will the patient continue to benefit from skilled PT intervention? Yes        Medical necessity is demonstrated by:   - Pain limits function of effected part for all activities  - Unable to participate in daily activities   - Requires skilled supervision to complete and progress HEP  - Fall risk - impaired balance   - Continued inability to participate in vocational pursuits    Progress towards goals: good    Short Term Goals ( 4-6 Weeks):   - Decrease c/o pain at rest and with activity 50%   - Pt will increase ROM to 50% of CL hip   - Pt will ambulate independently without an AD  - Pt independent with HEP with progressions   - Able to wash face  over sink without increase in hip pain      Long Term Goals ( 16-24 Weeks):  - Decrease c/o pain 100%  - Ascend/Descend 10 steps without AD without increase in hip pain/sxs  - Pt will increase % of CL limb for improved ability to perform ADLs and exercise  - Pt will increase strength of L hip EXT and ABD to 4+/5 for improved stability and motor control of the hip  - Pt to return to 100% full function         Plan    Continue with established Plan of Care towards PT goals with focus on decreasing pain, increasing ROM, strength, neuromuscular control and functional status   Therapist: Olivier Rand, PT  5/2/2018

## 2018-05-09 ENCOUNTER — CLINICAL SUPPORT (OUTPATIENT)
Dept: REHABILITATION | Facility: HOSPITAL | Age: 33
End: 2018-05-09
Payer: COMMERCIAL

## 2018-05-09 DIAGNOSIS — M25.552 ACUTE HIP PAIN, LEFT: ICD-10-CM

## 2018-05-09 PROCEDURE — 97110 THERAPEUTIC EXERCISES: CPT | Performed by: PHYSICAL THERAPIST

## 2018-05-09 NOTE — PROGRESS NOTES
Physical Therapy Daily Note     Date: 05/09/2018  Name: Sveta Saez  Clinic Number: [id number]  Diagnosis: Left hip pain   Physician: Vanessa Montes PA-C    Evaluation Date: 3/14/2018  Date of Surgery: 3/13/2018  1. Hip arthroscopic labral repair, knotless FiberTak (CPT 37976)  2. Hip arthroscopic femoral neck osteoplasty (CPT 34636)  3. Hip arthroscopic partial synovectomy/debridement (CPT 64157)  4. Hip arthroscopic capsular closure  5. Hip arthroscopic-assisted arthrocentesis of viable structural human allograft tissue      matrix (BioDRestore, supralabral recess) (CPT 23430)  6. Hip fluoroscopic guidance for needle placement/localization (CPT 48535)  Return to MD Date: 6 weeks from 4/25/2018  Visit #: 9 of 20 ending 12/31/2018  Start Time: 0730  Stop Time:  8:30  Total Treatment Time: 60'    Precautions: ROM limitations: FLEXION < 90 deg x 10 days, EXT 0 deg x 3 wks, ER 0 deg x 3 wks, IR no limits; weight bearing restriction 20# x 3 wks, brace 0-90 deg x 10 days, boot at night x 14 days, no water activities until incision closure    Subjective     Patient reports that although she feels she is improving, she still has difficulty ambulating community distances without having to stop secondary to pain / dysfunction / fatigue      Pain: 1/10 at rest    Objective   POD: 57  Measurements taken: tightness in piriformis, discomfort with CCW circumduction/FADIR, flexion > 90 deg  Sveta received therapeutic exercises to develop strength, endurance and ROM for 45 minutes including:  MH pre Rx x 10'   PROM - circumduction CW and CCW 1x20 each  Bike x 10' 5' FW and 5' BW  HSS 5x:15    Supine man RF stretch 3xs  Prone self hip flexor / RF stretch 5x;15  pilates ab 100s 3x10  Supine hip flex with red PB 2x10 + TA  R S/L hip ABD hold 1x10:06  R S/L clamshell 1x15  R S/L rev clamshells 1x15  L S/L hip ER 2x15   Quad cat/cow 1x15  Quad hip ext 2x10 R/ L    Shuttle LP U 1b  2x15  St on L LE slide right abd 1x10 and ext 1x10   CP x 10'     Written Home Exercises Provided: updated for supine hip flexion with ball, reverse clamshell, quad hip ext, and hip thruster  Pt demo good understanding of the education provided. Sveta demonstrated good return demonstration of activities.     Education provided:   Pt educated on avoiding water activities until the incision has fully healed and HEP progression. Continue to follow ROM and WB precautions. Pt also educated on benefits of using a stationary bike for hip health.  Sveta verbalized good understanding of education provided.   No spiritual or educational barriers to learning identified.    Assessment   Pt tolerated treatment well, continued improved exercise tolerance in terms of volume and intensity  this is a 33 y.o. female referred to outpatient physical therapy and presents with a medical diagnosis of Acute hip pain, left s/p hip labral repair with osteoplasty and demonstrates limitations as described in the problem list Pt prognosis is Good. Pt will continue to benefit from skilled outpatient physical therapy to address the deficits listed below in the problem list, provide pt/family education and to maximize pt's level of independence in the home and community environment.    Will the patient continue to benefit from skilled PT intervention? Yes        Medical necessity is demonstrated by:   - Pain limits function of effected part for all activities  - Unable to participate in daily activities   - Requires skilled supervision to complete and progress HEP  - Fall risk - impaired balance   - Continued inability to participate in vocational pursuits    Progress towards goals: good    Short Term Goals ( 4-6 Weeks):   - Decrease c/o pain at rest and with activity 50%   - Pt will increase ROM to 50% of CL hip   - Pt will ambulate independently without an AD  - Pt independent with HEP with progressions   - Able to wash face over sink without  increase in hip pain      Long Term Goals ( 16-24 Weeks):  - Decrease c/o pain 100%  - Ascend/Descend 10 steps without AD without increase in hip pain/sxs  - Pt will increase % of CL limb for improved ability to perform ADLs and exercise  - Pt will increase strength of L hip EXT and ABD to 4+/5 for improved stability and motor control of the hip  - Pt to return to 100% full function         Plan    Continue with established Plan of Care towards PT goals with focus on decreasing pain, increasing ROM, strength, neuromuscular control and functional status   Therapist: Olivier Rand, PT  5/9/2018

## 2018-05-16 ENCOUNTER — CLINICAL SUPPORT (OUTPATIENT)
Dept: REHABILITATION | Facility: HOSPITAL | Age: 33
End: 2018-05-16
Payer: COMMERCIAL

## 2018-05-16 DIAGNOSIS — M25.552 ACUTE HIP PAIN, LEFT: ICD-10-CM

## 2018-05-16 PROCEDURE — 97110 THERAPEUTIC EXERCISES: CPT | Performed by: PHYSICAL THERAPIST

## 2018-05-16 NOTE — PROGRESS NOTES
Physical Therapy Daily Note     Date: 05/16/2018  Name: Sveta Saez  Clinic Number: [id number]  Diagnosis: Left hip pain   Physician: Vanessa Montes PA-C    Evaluation Date: 3/14/2018  Date of Surgery: 3/13/2018  1. Hip arthroscopic labral repair, knotless FiberTak (CPT 72776)  2. Hip arthroscopic femoral neck osteoplasty (CPT 71070)  3. Hip arthroscopic partial synovectomy/debridement (CPT 49812)  4. Hip arthroscopic capsular closure  5. Hip arthroscopic-assisted arthrocentesis of viable structural human allograft tissue      matrix (BioDRestore, supralabral recess) (CPT 45290)  6. Hip fluoroscopic guidance for needle placement/localization (CPT 99161)  Return to MD Date: 6 weeks from 4/25/2018  Visit #: 10 of 20 ending 12/31/2018  Start Time: 0730  Stop Time:  8:30  Total Treatment Time: 60'    Precautions: ROM limitations: FLEXION < 90 deg x 10 days, EXT 0 deg x 3 wks, ER 0 deg x 3 wks, IR no limits; weight bearing restriction 20# x 3 wks, brace 0-90 deg x 10 days, boot at night x 14 days, no water activities until incision closure    Subjective     Patient reports no new c/o this AM in L hip, willing to attempt Swati as tolerated        Pain: 0/10 at rest    Objective   POD: 64  Measurements taken: tightness in piriformis, discomfort with CCW circumduction/FADIR, flexion > 90 deg  Sveta received therapeutic exercises to develop strength, endurance and ROM for 45 minutes including:   PROM - circumduction CW and CCW 1x20 each  Bike x 10' 5' FW and 5' BW  HSS 5x:15    Supine man RF stretch 3xs  pilates ab 100s 3x15  Supine hip flex with red PB 2x10 + TA  Butt winking 1x15:05   B bridge 2x10:05   R S/L hip ABD  2x10:05  R S/L clamshell 2x15  R S/L rev clamshells 2x15  L S/L hip ER 3x15   Quad cat/cow 1x15  Quad hip ext 2x10 R/ L    Shuttle LP U 1b 2x15  St on L LE slide right abd 1x10 and ext 1x10   CP x 10'     Written Home Exercises Provided: updated for supine  hip flexion with ball, reverse clamshell, quad hip ext, and hip thruster  Pt demo good understanding of the education provided. Sveta demonstrated good return demonstration of activities.     Education provided:   Pt educated on avoiding water activities until the incision has fully healed and HEP progression. Continue to follow ROM and WB precautions. Pt also educated on benefits of using a stationary bike for hip health.  Sveta verbalized good understanding of education provided.   No spiritual or educational barriers to learning identified.    Assessment   Pt tolerated treatment well, again, improved exercise tolerance in terms of volume and intensity  this is a 33 y.o. female referred to outpatient physical therapy and presents with a medical diagnosis of Acute hip pain, left s/p hip labral repair with osteoplasty and demonstrates limitations as described in the problem list Pt prognosis is Good. Pt will continue to benefit from skilled outpatient physical therapy to address the deficits listed below in the problem list, provide pt/family education and to maximize pt's level of independence in the home and community environment.    Will the patient continue to benefit from skilled PT intervention? Yes        Medical necessity is demonstrated by:   - Pain limits function of effected part for all activities  - Unable to participate in daily activities   - Requires skilled supervision to complete and progress HEP  - Fall risk - impaired balance   - Continued inability to participate in vocational pursuits    Progress towards goals: good    Short Term Goals ( 4-6 Weeks):   - Decrease c/o pain at rest and with activity 50%   - Pt will increase ROM to 50% of CL hip   - Pt will ambulate independently without an AD  - Pt independent with HEP with progressions   - Able to wash face over sink without increase in hip pain      Long Term Goals ( 16-24 Weeks):  - Decrease c/o pain 100%  - Ascend/Descend 10 steps without AD  without increase in hip pain/sxs  - Pt will increase % of CL limb for improved ability to perform ADLs and exercise  - Pt will increase strength of L hip EXT and ABD to 4+/5 for improved stability and motor control of the hip  - Pt to return to 100% full function         Plan    Continue with established Plan of Care towards PT goals with focus on decreasing pain, increasing ROM, strength, neuromuscular control and functional status   Therapist: Olivier Rand, PT  5/16/2018

## 2018-05-23 ENCOUNTER — CLINICAL SUPPORT (OUTPATIENT)
Dept: REHABILITATION | Facility: HOSPITAL | Age: 33
End: 2018-05-23
Payer: COMMERCIAL

## 2018-05-23 DIAGNOSIS — M25.552 ACUTE HIP PAIN, LEFT: ICD-10-CM

## 2018-05-23 PROCEDURE — 97110 THERAPEUTIC EXERCISES: CPT | Performed by: PHYSICAL THERAPIST

## 2018-05-23 NOTE — PROGRESS NOTES
Physical Therapy Daily Note     Date: 05/23/2018  Name: Sveta Saez  Clinic Number: [id number]  Diagnosis: Left hip pain   Physician: Vanessa Montes PA-C    Evaluation Date: 3/14/2018  Date of Surgery: 3/13/2018  1. Hip arthroscopic labral repair, knotless FiberTak (CPT 54895)  2. Hip arthroscopic femoral neck osteoplasty (CPT 63034)  3. Hip arthroscopic partial synovectomy/debridement (CPT 15927)  4. Hip arthroscopic capsular closure  5. Hip arthroscopic-assisted arthrocentesis of viable structural human allograft tissue      matrix (BioDRestore, supralabral recess) (CPT 39417)  6. Hip fluoroscopic guidance for needle placement/localization (CPT 95254)  Return to MD Date: 6 weeks from 4/25/2018  Visit #: 11 of 20 ending 12/31/2018  Start Time: 0730  Stop Time:  8:30  Total Treatment Time: 60'    Precautions: ROM limitations: FLEXION < 90 deg x 10 days, EXT 0 deg x 3 wks, ER 0 deg x 3 wks, IR no limits; weight bearing restriction 20# x 3 wks, brace 0-90 deg x 10 days, boot at night x 14 days, no water activities until incision closure    Subjective     Patient reports no new c/o this AM in L hip, continued pain/soreness with hip flex/IR (FADIR) position         Pain: 0/10 at rest    Objective   POD: 71  Measurements taken: tightness in piriformis, discomfort with CCW circumduction/FADIR, flexion > 90 deg  Sveta received therapeutic exercises to develop strength, endurance and ROM for 45 minutes including:   Bike x 10' 5' FW and 5' BW  PROM - circumduction CW and CCW 1x20 each, hip IR / flex 1x15  Supine self RF stretch 5x:15  B bridge 2x10:10  Hip hiking red PB yll box 3xburn R/L  Saddle stretch 2x:60  L S/L hip ER 3x15   Mod side plank 1x10:01  Mod front plank 1x10:01  Goblet squat 2x15   CP x 10'     Written Home Exercises Provided: updated for supine hip flexion with ball, reverse clamshell, quad hip ext, and hip thruster  Pt demo good understanding of the  education provided. Sveta demonstrated good return demonstration of activities.     Education provided:   pt also educated on benefits of using a stationary bike for hip health.  Sveta verbalized good understanding of education provided.   No spiritual or educational barriers to learning identified.    Assessment   Pt tolerated treatment without increase in sxs, decreased TE volume due to increased TE intensity  this is a 33 y.o. female referred to outpatient physical therapy and presents with a medical diagnosis of Acute hip pain, left s/p hip labral repair with osteoplasty and demonstrates limitations as described in the problem list Pt prognosis is Good. Pt will continue to benefit from skilled outpatient physical therapy to address the deficits listed below in the problem list, provide pt/family education and to maximize pt's level of independence in the home and community environment.    Will the patient continue to benefit from skilled PT intervention? Yes        Medical necessity is demonstrated by:   - Pain limits function of effected part for all activities  - Unable to participate in daily activities   - Requires skilled supervision to complete and progress HEP  - Fall risk - impaired balance   - Continued inability to participate in vocational pursuits    Progress towards goals: good    Short Term Goals ( 4-6 Weeks):   - Decrease c/o pain at rest and with activity 50%   - Pt will increase ROM to 50% of CL hip   - Pt will ambulate independently without an AD  - Pt independent with HEP with progressions   - Able to wash face over sink without increase in hip pain      Long Term Goals ( 16-24 Weeks):  - Decrease c/o pain 100%  - Ascend/Descend 10 steps without AD without increase in hip pain/sxs  - Pt will increase % of CL limb for improved ability to perform ADLs and exercise  - Pt will increase strength of L hip EXT and ABD to 4+/5 for improved stability and motor control of the hip  - Pt to return  to 100% full function         Plan    Continue with established Plan of Care towards PT goals with focus on decreasing pain, increasing ROM, strength, neuromuscular control and functional status   Therapist: Olivier Rand, PT  5/23/2018

## 2018-05-29 ENCOUNTER — TELEPHONE (OUTPATIENT)
Dept: SPORTS MEDICINE | Facility: CLINIC | Age: 33
End: 2018-05-29

## 2018-05-29 NOTE — TELEPHONE ENCOUNTER
----- Message from Jazmin Garduno sent at 5/29/2018  2:29 PM CDT -----  Contact: Pt   Pt is requesting a call back in regards to rescheduling her upcoming post op appt         Pt can be contacted at [phone]

## 2018-05-30 ENCOUNTER — CLINICAL SUPPORT (OUTPATIENT)
Dept: REHABILITATION | Facility: HOSPITAL | Age: 33
End: 2018-05-30
Payer: COMMERCIAL

## 2018-05-30 DIAGNOSIS — M25.552 ACUTE HIP PAIN, LEFT: ICD-10-CM

## 2018-05-30 PROCEDURE — 97110 THERAPEUTIC EXERCISES: CPT | Performed by: PHYSICAL THERAPIST

## 2018-05-30 NOTE — PROGRESS NOTES
Physical Therapy Daily Note     Date: 05/30/2018  Name: Sveta Saez  Clinic Number: [id number]  Diagnosis: Left hip pain   Physician: Vanessa Montes PA-C    Evaluation Date: 3/14/2018  Date of Surgery: 3/13/2018  1. Hip arthroscopic labral repair, knotless FiberTak (CPT 64479)  2. Hip arthroscopic femoral neck osteoplasty (CPT 12842)  3. Hip arthroscopic partial synovectomy/debridement (CPT 78616)  4. Hip arthroscopic capsular closure  5. Hip arthroscopic-assisted arthrocentesis of viable structural human allograft tissue      matrix (BioDRestore, supralabral recess) (CPT 13327)  6. Hip fluoroscopic guidance for needle placement/localization (CPT 31248)  Return to MD Date: 6 weeks from 4/25/2018  Visit #: 12 of 20 ending 12/31/2018  Start Time: 0700  Stop Time:  8:00  Total Treatment Time: 60'    Precautions: ROM limitations: FLEXION < 90 deg x 10 days, EXT 0 deg x 3 wks, ER 0 deg x 3 wks, IR no limits; weight bearing restriction 20# x 3 wks, brace 0-90 deg x 10 days, boot at night x 14 days, no water activities until incision closure    Subjective     Patient reports increased soreness L hip this AM from increased activity over weekend, including extended walking through airport and flying          Pain: 3/10 at rest    Objective   POD: 78  Measurements taken:  Pain scale as above  Sveta received therapeutic exercises to develop strength, endurance and ROM for 45 minutes including:   Bike x 10' 5' FW and 5' BW  PROM - circumduction CW and CCW 1x20 each, hip IR / flex 1x15  HSS 5x;15 R/L   prone self RF stretch 5x:15 R/L  Prone JOYCELYN 3'x2  Quad rock 1x15  Supine TA 1x10:10  Supine TA + marching 2x10 R/L  Supine TA + heel slides 2x10 R/L  Supine TA + pilates ab crunch 3x10  Clamshell 1x15  Rev clamshell 1x15  L S/L hip ER 1x15   Quad TA 1x10:10 + pelvic floor  Self ROM hip IR in semi reclined position 2x15 R/L    CP x 10'     Written Home Exercises Provided:  updated for semi reclined hip IR   Pt demo good understanding of the education provided. Sveta demonstrated good return demonstration of activities.     Education provided:   pt also educated on benefits of using a stationary bike for hip health.  Sveta verbalized good understanding of education provided.   No spiritual or educational barriers to learning identified.    Assessment   Pt tolerated treatment without increase in sxs, decreased TE volume due to increased sxs   this is a 33 y.o. female referred to outpatient physical therapy and presents with a medical diagnosis of Acute hip pain, left s/p hip labral repair with osteoplasty and demonstrates limitations as described in the problem list Pt prognosis is Good. Pt will continue to benefit from skilled outpatient physical therapy to address the deficits listed below in the problem list, provide pt/family education and to maximize pt's level of independence in the home and community environment.    Will the patient continue to benefit from skilled PT intervention? Yes        Medical necessity is demonstrated by:   - Pain limits function of effected part for all activities  - Unable to participate in daily activities   - Requires skilled supervision to complete and progress HEP  - Fall risk - impaired balance   - Continued inability to participate in vocational pursuits    Progress towards goals: good    Short Term Goals ( 4-6 Weeks):   - Decrease c/o pain at rest and with activity 50%   - Pt will increase ROM to 50% of CL hip   - Pt will ambulate independently without an AD  - Pt independent with HEP with progressions   - Able to wash face over sink without increase in hip pain      Long Term Goals ( 16-24 Weeks):  - Decrease c/o pain 100%  - Ascend/Descend 10 steps without AD without increase in hip pain/sxs  - Pt will increase % of CL limb for improved ability to perform ADLs and exercise  - Pt will increase strength of L hip EXT and ABD to 4+/5 for  improved stability and motor control of the hip  - Pt to return to 100% full function         Plan    Continue with established Plan of Care towards PT goals with focus on decreasing pain, increasing ROM, strength, neuromuscular control and functional status   Therapist: Olivier Rand, PT  5/30/2018

## 2018-06-06 ENCOUNTER — OFFICE VISIT (OUTPATIENT)
Dept: SPORTS MEDICINE | Facility: CLINIC | Age: 33
End: 2018-06-06
Payer: COMMERCIAL

## 2018-06-06 ENCOUNTER — HOSPITAL ENCOUNTER (OUTPATIENT)
Dept: RADIOLOGY | Facility: HOSPITAL | Age: 33
Discharge: HOME | End: 2018-06-06
Attending: ORTHOPAEDIC SURGERY
Payer: COMMERCIAL

## 2018-06-06 VITALS
WEIGHT: 130 LBS | DIASTOLIC BLOOD PRESSURE: 73 MMHG | SYSTOLIC BLOOD PRESSURE: 116 MMHG | HEIGHT: 64 IN | HEART RATE: 69 BPM | BODY MASS INDEX: 22.2 KG/M2

## 2018-06-06 DIAGNOSIS — M25.559 PAIN IN JOINT INVOLVING PELVIC REGION AND THIGH, UNSPECIFIED LATERALITY: ICD-10-CM

## 2018-06-06 DIAGNOSIS — M25.552 LEFT HIP PAIN: ICD-10-CM

## 2018-06-06 DIAGNOSIS — M25.552 LEFT HIP PAIN: Primary | ICD-10-CM

## 2018-06-06 PROCEDURE — 99999 PR PBB SHADOW E&M-EST. PATIENT-LVL III: CPT | Mod: PBBFAC,,, | Performed by: ORTHOPAEDIC SURGERY

## 2018-06-06 PROCEDURE — 73503 X-RAY EXAM HIP UNI 4/> VIEWS: CPT | Mod: 26,LT,, | Performed by: RADIOLOGY

## 2018-06-06 PROCEDURE — 73503 X-RAY EXAM HIP UNI 4/> VIEWS: CPT | Mod: TC,PO,LT

## 2018-06-06 PROCEDURE — 99024 POSTOP FOLLOW-UP VISIT: CPT | Mod: S$GLB,,, | Performed by: ORTHOPAEDIC SURGERY

## 2018-06-06 RX ORDER — MELOXICAM 15 MG/1
15 TABLET ORAL DAILY
Qty: 60 TABLET | Refills: 2 | Status: SHIPPED | OUTPATIENT
Start: 2018-06-06

## 2018-06-06 NOTE — PROGRESS NOTES
She is here for post op visit.  Pt is attending PT at [place] with Olivier Rand    Is doing ok overall, but feels some regression from last visit.  She mainly attributes this to being more active recently, having gone to [employer] and walked more than in the past.      Feels 75% better vs prior to surgery (90% at last visit)    DATE OF PROCEDURE: 3/13/2018     SURGEON:  Marivel Summers M.D.     PROCEDURE:    Left:  1. Hip arthroscopic labral repair, knotless FiberTak (CPT 89009)  2. Hip arthroscopic femoral neck osteoplasty (CPT 94120)  3. Hip arthroscopic partial synovectomy/debridement (CPT 58268)  4. Hip arthroscopic capsular closure  5. Hip arthroscopic-assisted arthrocentesis of viable structural human allograft tissue      matrix (BioDRestore, supralabral recess) (CPT 05663)  6. Hip fluoroscopic guidance for needle placement/localization (CPT 89537)      PHYSICAL EXAM / HIP  PHYSICAL EXAMINATION  General:  The patient is alert and oriented x 3.  Mood is pleasant.  Observation of ears, eyes and nose reveal no gross abnormalities.  No labored breathing observed.    left HIP EXAMINATION     OBSERVATION / INSPECTION  Gait:   Nonantalgic   Alignment:  Neutral   Scars:   None   Muscle atrophy: None   Effusion:  None   Warmth:  None   Discoloration:   None   Leg lengths:   Equal   Pelvis:   Level     Incision sites healed well. Sutures removed today with no issues. No wound infection, wound dehiscence, or wound drainage noted today. Area cleaned before and after with alcohol gauze pads.     Minimal effusion  No swelling, no calf tenderness                                    TENDERNESS / CREPITUS (T/C):      T / C  Trochanteric bursa   - / -  Piriformis    - / -  SI joint    - / -  Psoas tendon   - / -  Rectus insertion  - / -  Adductor insertion  - / -  Pubic symphysis  - / -    ROM: (* = pain)    Flexion:    100 degrees *pain past 90  External rotation: 40 degrees  Internal rotation: 15 degrees *pain  Abduction:  45  degrees  Adduction:   20 degrees    EXTREMITY NEURO-VASCULAR EXAMINATION:   Sensation:  Grossly intact to light touch all dermatomal regions.   Motor Function:  Fully intact motor function at hip, knee, foot and ankle    DTRs;  quadriceps and  achilles 2+.  No clonus and downgoing Babinski.    Vascular status:  DP and PT pulses 2+, brisk capillary refill, symmetric.    Skin:  intact, compartments soft.    OTHER FINDINGS:  - Kevin's sign    ASSESSMENT:  Status post above, doing well.     PLAN:                                                                                                                                   1. Continue with PT.   2. Ease up on activities until pain resolves/improves.  3. I have discussed return to activity in detail.  4.Patient will see us back 8 weeks                                 5. All questions were answered and patient should contact us if she  has any questions or concerns in the interim.     6. mobic

## 2018-06-07 ENCOUNTER — CLINICAL SUPPORT (OUTPATIENT)
Dept: REHABILITATION | Facility: HOSPITAL | Age: 33
End: 2018-06-07
Payer: COMMERCIAL

## 2018-06-07 DIAGNOSIS — M25.552 ACUTE HIP PAIN, LEFT: ICD-10-CM

## 2018-06-07 PROCEDURE — 97110 THERAPEUTIC EXERCISES: CPT | Performed by: PHYSICAL THERAPIST

## 2018-06-07 NOTE — PROGRESS NOTES
"                                                  Physical Therapy Daily Note     Date: 06/07/2018  Name: Sveta Saez  Clinic Number: [id number]  Diagnosis: Left hip pain   Physician: Vanessa Montes PA-C    Evaluation Date: 3/14/2018  Date of Surgery: 3/13/2018  1. Hip arthroscopic labral repair, knotless FiberTak (CPT 16319)  2. Hip arthroscopic femoral neck osteoplasty (CPT 96281)  3. Hip arthroscopic partial synovectomy/debridement (CPT 80984)  4. Hip arthroscopic capsular closure  5. Hip arthroscopic-assisted arthrocentesis of viable structural human allograft tissue      matrix (BioDRestore, supralabral recess) (CPT 89740)  6. Hip fluoroscopic guidance for needle placement/localization (CPT 68162)  Return to MD Date: 6 weeks from 4/25/2018  Visit #: 13 of 20 ending 12/31/2018  Start Time: 0700  Stop Time:  8:00  Total Treatment Time: 60'    Precautions: ROM limitations: FLEXION < 90 deg x 10 days, EXT 0 deg x 3 wks, ER 0 deg x 3 wks, IR no limits; weight bearing restriction 20# x 3 wks, brace 0-90 deg x 10 days, boot at night x 14 days, no water activities until incision closure    Subjective     Patient reports slight soreness in anterior L hip this AM, overall feels she is improving but slowly, saw MD who recommended she continue with Mobic and noted "it's going to stay sore a little while longer"            Pain: 1/10 at rest anterior L hip region - hip flexor    Objective   POD: 93  Measurements taken:   Continued loss of flexibility hip flexor and hip IR ROM     Sveta received therapeutic exercises to develop strength, endurance and ROM for 45 minutes including:   STM L hip flexor   Bike x 10' 5' FW and 5' BW  PROM - circumduction CW and CCW 1x20 each, hip IR / flex 1x15  Supine man RF stretch 5x;15  Supine man HSS 3x;15 3D   Supine self butterfly stretch 5x:15   B bridge 2x10:05  LLR 2x10;05 0#  Mod front plank 5x;10  Mod side plank 5x:10  Hip hike using red PB/yll box 3x15 R/L  Goblet squat 3x10  RDL " 2x10    CP x 10'     Written Home Exercises Provided: pt to cont focus on restoring hip IR ROM at home   Pt demo good understanding of the education provided. Sveta demonstrated good return demonstration of activities.     Education provided:   pt also educated on benefits of using a stationary bike for hip health.  Sveta verbalized good understanding of education provided.   No spiritual or educational barriers to learning identified.    Assessment   Pt tolerated treatment without increase in sxs, pain scale still 1 and got a very good training effect in L hip musculature without increase in pain/soreness    Will the patient continue to benefit from skilled PT intervention? Yes        Medical necessity is demonstrated by:   - Pain limits function of effected part for all activities  - Unable to participate in daily activities   - Requires skilled supervision to complete and progress HEP  - Fall risk - impaired balance   - Continued inability to participate in vocational pursuits    Progress towards goals: good    Short Term Goals ( 4-6 Weeks):   - Decrease c/o pain at rest and with activity 50%   - Pt will increase ROM to 50% of CL hip   - Pt will ambulate independently without an AD  - Pt independent with HEP with progressions   - Able to wash face over sink without increase in hip pain      Long Term Goals ( 16-24 Weeks):  - Decrease c/o pain 100%  - Ascend/Descend 10 steps without AD without increase in hip pain/sxs  - Pt will increase % of CL limb for improved ability to perform ADLs and exercise  - Pt will increase strength of L hip EXT and ABD to 4+/5 for improved stability and motor control of the hip  - Pt to return to 100% full function         Plan    Continue with established Plan of Care towards PT goals with focus on decreasing pain, increasing ROM, strength, neuromuscular control and functional status   Continue with STM to hip flexor  NV if appropriate to decrease symptoms  Therapist: Olivier  Giorgi, PT  6/7/2018

## 2018-06-14 ENCOUNTER — CLINICAL SUPPORT (OUTPATIENT)
Dept: REHABILITATION | Facility: HOSPITAL | Age: 33
End: 2018-06-14
Payer: COMMERCIAL

## 2018-06-14 DIAGNOSIS — M25.552 ACUTE HIP PAIN, LEFT: ICD-10-CM

## 2018-06-14 PROCEDURE — 97140 MANUAL THERAPY 1/> REGIONS: CPT | Performed by: PHYSICAL THERAPIST

## 2018-06-14 PROCEDURE — 97110 THERAPEUTIC EXERCISES: CPT | Performed by: PHYSICAL THERAPIST

## 2018-06-14 NOTE — PROGRESS NOTES
"                                                  Physical Therapy Daily Note     Date: 06/14/2018  Name: Sveta Saez  Clinic Number: [id number]  Diagnosis: Left hip pain   Physician: Vanessa Montes PA-C    Evaluation Date: 3/14/2018  Date of Surgery: 3/13/2018  1. Hip arthroscopic labral repair, knotless FiberTak (CPT 90558)  2. Hip arthroscopic femoral neck osteoplasty (CPT 53946)  3. Hip arthroscopic partial synovectomy/debridement (CPT 87466)  4. Hip arthroscopic capsular closure  5. Hip arthroscopic-assisted arthrocentesis of viable structural human allograft tissue      matrix (BioDRestore, supralabral recess) (CPT 13689)  6. Hip fluoroscopic guidance for needle placement/localization (CPT 82352)  Return to MD Date: 6 weeks from 4/25/2018  Visit #: 14 of 20 ending 12/31/2018  Start Time: 0700  Stop Time:  8:10  Total Treatment Time: 70'    Precautions: ROM limitations: FLEXION < 90 deg x 10 days, EXT 0 deg x 3 wks, ER 0 deg x 3 wks, IR no limits; weight bearing restriction 20# x 3 wks, brace 0-90 deg x 10 days, boot at night x 14 days, no water activities until incision closure    Subjective     Patient reports "My hip doesn't hurt this morning because I haven't been doing anything"  Pt feels her hip is 60% at this time            Pain: 0/10 at rest and with household distance level surface ambulation     Objective   POD: 100  Measurements taken:   MMT: hip flex 3+/5, abd 4-/5, ER 4-/5, IR 3+/5    received the following manual therapy techniques: Joint mobilizations to the hip were applied to restore full ROM and flexibility to assist with ADLs including dressing, bathing and shaving x 8' for  minutes.   Grade III long axis distraction  Belt inf glide + ER and IR     Sveta received therapeutic exercises to develop strength, endurance and ROM for 45 minutes including:    ET x 7'  PROM - circumduction CW and CCW 1x20 each, hip IR / flex 1x15  Prone self  RF stretch 5x;15  Supine man HSS 3x;15 3D   Supine self " butterfly stretch 5x:15  St quad stretch 3x:15   U bridge 1x20:05 L only  LLR st red loop band 3xburn R/L   full front plank 5x;15  Ball squat 3x10  Lunge cook band assist purple 3x7 1/2 range  RDL B purple cook band 1x10 R/L     CP x 10'     Written Home Exercises Provided: add ET to HEP    Pt demo good understanding of the education provided. Sveta demonstrated good return demonstration of activities.     Education provided:   pt also educated on benefits of using a stationary bike for hip health.  Sveta verbalized good understanding of education provided.   No spiritual or educational barriers to learning identified.    Assessment   Pt tolerated treatment without increase in sxs, muscle weakness evident t/o L hip affecting ADLs including community distance ambulation, balance and amb on unlevel surfaces    Will the patient continue to benefit from skilled PT intervention? Yes        Medical necessity is demonstrated by:   - Pain limits function of effected part for all activities  - Unable to participate in daily activities   - Requires skilled supervision to complete and progress HEP  - Fall risk - impaired balance   - Continued inability to participate in vocational pursuits    Progress towards goals: good    Short Term Goals ( 4-6 Weeks):   - Decrease c/o pain at rest and with activity 50%   - Pt will increase ROM to 50% of CL hip   - Pt will ambulate independently without an AD  - Pt independent with HEP with progressions   - Able to wash face over sink without increase in hip pain      Long Term Goals ( 16-24 Weeks):  - Decrease c/o pain 100%  - Ascend/Descend 10 steps without AD without increase in hip pain/sxs  - Pt will increase % of CL limb for improved ability to perform ADLs and exercise  - Pt will increase strength of L hip EXT and ABD to 4+/5 for improved stability and motor control of the hip  - Pt to return to 100% full function         Plan    Continue with established Plan of Care towards  PT goals with focus on decreasing pain, increasing ROM, strength, neuromuscular control and functional status   Continue with jt mobilization NV    Therapist: Olivier Rand, PT

## 2018-06-20 ENCOUNTER — CLINICAL SUPPORT (OUTPATIENT)
Dept: REHABILITATION | Facility: HOSPITAL | Age: 33
End: 2018-06-20
Payer: COMMERCIAL

## 2018-06-20 DIAGNOSIS — M25.552 ACUTE HIP PAIN, LEFT: ICD-10-CM

## 2018-06-20 PROCEDURE — 97140 MANUAL THERAPY 1/> REGIONS: CPT | Performed by: PHYSICAL THERAPIST

## 2018-06-20 PROCEDURE — 97110 THERAPEUTIC EXERCISES: CPT | Performed by: PHYSICAL THERAPIST

## 2018-06-20 NOTE — PROGRESS NOTES
Physical Therapy Daily Note     Date: 06/20/2018  Name: Sveta Saez  Clinic Number: [id number]  Diagnosis: Left hip pain   Physician: Vanessa Montes PA-C    Evaluation Date: 3/14/2018  Date of Surgery: 3/13/2018  1. Hip arthroscopic labral repair, knotless FiberTak (CPT 09929)  2. Hip arthroscopic femoral neck osteoplasty (CPT 44848)  3. Hip arthroscopic partial synovectomy/debridement (CPT 49858)  4. Hip arthroscopic capsular closure  5. Hip arthroscopic-assisted arthrocentesis of viable structural human allograft tissue      matrix (BioDRestore, supralabral recess) (CPT 16021)  6. Hip fluoroscopic guidance for needle placement/localization (CPT 83235)  Return to MD Date: 6 weeks from 4/25/2018  Visit #: 14 of 20 ending 12/31/2018  Start Time: 700  Stop Time:  8:00  Total Treatment Time: 60'    Precautions: none    Subjective     Patient reports that her hip is 75% at this time (improved from 60%) and that her two main issues are volume (community distance ambulation) and putting on her socks/shoes    Pain: 0/10 at rest and with household distance level surface ambulation     Objective   POD: 107  Measurements taken:  ( MMT: hip flex 3+/5, abd 4-/5, ER 4-/5, IR 3+/5)    received the following manual therapy techniques: Joint mobilizations to the hip were applied to restore full ROM and flexibility to assist with ADLs including dressing, bathing and shaving x 8' for  minutes.   Grade III long axis distraction  Belt inf glide + ER and IR     Sveta received therapeutic exercises to develop strength, endurance and ROM for 30 minutes including:    Bike x 5'   ET x 5'   PROM - circumduction CW and CCW 1x20 each, hip IR / flex 1x15  Prone self  RF stretch 5x;15  Supine man HSS 3x;15 3D   B bridge 1x10:05 with opp foot on med ball R/L   full front plank 5x;15  Goblet squat 3x10 15#  Split squat 3x10 R/L  SLS foam EO 5x:15     CP x 10'     Written Home Exercises Provided:  add balance to HEP  Pt demo good understanding of the education provided. Sveta demonstrated good return demonstration of activities.     Education provided:   pt also educated on benefits of using a stationary bike for hip health.  Sveta verbalized good understanding of education provided.   No spiritual or educational barriers to learning identified.    Assessment   Pt tolerated treatment without increase in sxs, poor balance noted ffecting ADLs including community distance ambulation, balance and amb on unlevel surfaces    Will the patient continue to benefit from skilled PT intervention? Yes        Medical necessity is demonstrated by:   - Pain limits function of effected part for all activities  - Unable to participate in daily activities   - Requires skilled supervision to complete and progress HEP  - Fall risk - impaired balance   - Continued inability to participate in vocational pursuits    Progress towards goals: good    Short Term Goals ( 4-6 Weeks):   - Decrease c/o pain at rest and with activity 50%   - Pt will increase ROM to 50% of CL hip   - Pt will ambulate independently without an AD  - Pt independent with HEP with progressions   - Able to wash face over sink without increase in hip pain      Long Term Goals ( 16-24 Weeks):  - Decrease c/o pain 100%  - Ascend/Descend 10 steps without AD without increase in hip pain/sxs  - Pt will increase % of CL limb for improved ability to perform ADLs and exercise  - Pt will increase strength of L hip EXT and ABD to 4+/5 for improved stability and motor control of the hip  - Pt to return to 100% full function         Plan    Continue with established Plan of Care towards PT goals with focus on decreasing pain, increasing ROM, strength, neuromuscular control and functional status   Continue with jt mobilization NV    Therapist: Olivier Rand, PT

## 2018-06-27 ENCOUNTER — CLINICAL SUPPORT (OUTPATIENT)
Dept: REHABILITATION | Facility: HOSPITAL | Age: 33
End: 2018-06-27
Payer: COMMERCIAL

## 2018-06-27 DIAGNOSIS — M25.552 ACUTE HIP PAIN, LEFT: ICD-10-CM

## 2018-06-27 PROCEDURE — 97110 THERAPEUTIC EXERCISES: CPT | Performed by: PHYSICAL THERAPIST

## 2018-06-27 NOTE — PROGRESS NOTES
Physical Therapy Daily Note     Date: 06/27/2018     Name: Sveta Saez  Clinic Number: [id number]  Diagnosis: Left hip pain   Physician: Vanessa Montes PA-C    Evaluation Date: 3/14/2018  Date of Surgery: 3/13/2018  1. Hip arthroscopic labral repair, knotless FiberTak (CPT 05501)  2. Hip arthroscopic femoral neck osteoplasty (CPT 09297)  3. Hip arthroscopic partial synovectomy/debridement (CPT 31621)  4. Hip arthroscopic capsular closure  5. Hip arthroscopic-assisted arthrocentesis of viable structural human allograft tissue      matrix (BioDRestore, supralabral recess) (CPT 88480)  6. Hip fluoroscopic guidance for needle placement/localization (CPT 56298)  Return to MD Date: 6 weeks from 4/25/2018  Visit #: 14 of 20 ending 12/31/2018  Start Time: 700  Stop Time:  8:00  Total Treatment Time: 60'    Precautions: none    Subjective     Patient reports that she fell out of a brown at a restaurant on Monday with some resultant soreness in L hip, but feels this has resolved and   (that her hip is 75% at this time)     Pain: 0/10 at rest and with household distance level surface ambulation     Objective   POD: 114  Measurements taken:  ( MMT: hip flex 3+/5, abd 4-/5, ER 4-/5, IR 3+/5)    received the following manual therapy techniques: Joint mobilizations to the hip were applied to restore full ROM and flexibility to assist with ADLs including dressing, bathing and shaving x  For 4  minutes.   Grade III long axis distraction    Sveta received therapeutic exercises to develop strength, endurance and ROM for 30 minutes including:    Bike x 5'   ET x 5'   PROM - circumduction CW and CCW 1x20 each, hip IR / flex 1x15  Self stretch RF supine 5x:15  1/2 kneeling self  RF stretch 5x;15  Bridge marching 3x5    Hip abd band walks 3xburn yll loop band   LP U 60# 3x10  HS k' ext 3x10 0# U 90-0    CP x 10'     Written Home Exercises Provided:  No changes to HEP  Pt demo good  understanding of the education provided. Sveta demonstrated good return demonstration of activities.     Education provided:   pt also educated on benefits of using a stationary bike for hip health.  Sveta verbalized good understanding of education provided.   No spiritual or educational barriers to learning identified.    Assessment   Pt tolerated treatment without increase in sxs, good training effect in LE musculature without pain in L hip     Will the patient continue to benefit from skilled PT intervention? Yes        Medical necessity is demonstrated by:   - Pain limits function of effected part for all activities  - Unable to participate in daily activities   - Requires skilled supervision to complete and progress HEP  - Fall risk - impaired balance   - Continued inability to participate in vocational pursuits    Progress towards goals: good    Short Term Goals ( 4-6 Weeks):   - Decrease c/o pain at rest and with activity 50%   - Pt will increase ROM to 50% of CL hip   - Pt will ambulate independently without an AD  - Pt independent with HEP with progressions   - Able to wash face over sink without increase in hip pain      Long Term Goals ( 16-24 Weeks):  - Decrease c/o pain 100%  - Ascend/Descend 10 steps without AD without increase in hip pain/sxs  - Pt will increase % of CL limb for improved ability to perform ADLs and exercise  - Pt will increase strength of L hip EXT and ABD to 4+/5 for improved stability and motor control of the hip  - Pt to return to 100% full function         Plan    Continue with established Plan of Care towards PT goals with focus on decreasing pain, increasing ROM, strength, neuromuscular control and functional status   Continue with jt mobilization NV    Therapist: Olivier Rand, PT

## 2018-07-05 ENCOUNTER — CLINICAL SUPPORT (OUTPATIENT)
Dept: REHABILITATION | Facility: HOSPITAL | Age: 33
End: 2018-07-05
Payer: COMMERCIAL

## 2018-07-05 DIAGNOSIS — M25.552 ACUTE HIP PAIN, LEFT: ICD-10-CM

## 2018-07-05 PROCEDURE — 97110 THERAPEUTIC EXERCISES: CPT | Performed by: PHYSICAL THERAPIST

## 2018-07-05 NOTE — PROGRESS NOTES
"                                                  Physical Therapy Daily Note     Date: 07/05/2018     Name: Sveta Saez  Clinic Number: [id number]  Diagnosis: Left hip pain   Physician: Vanessa Montes PA-C    Evaluation Date: 3/14/2018  Date of Surgery: 3/13/2018  1. Hip arthroscopic labral repair, knotless FiberTak (CPT 72863)  2. Hip arthroscopic femoral neck osteoplasty (CPT 61438)  3. Hip arthroscopic partial synovectomy/debridement (CPT 66580)  4. Hip arthroscopic capsular closure  5. Hip arthroscopic-assisted arthrocentesis of viable structural human allograft tissue      matrix (BioDRestore, supralabral recess) (CPT 88826)  6. Hip fluoroscopic guidance for needle placement/localization (CPT 93611)  Return to MD Date: 6 weeks from 4/25/2018  Visit #: 15 of 20 ending 12/31/2018  Start Time: 700  Stop Time:  8:00  Total Treatment Time: 60'    Precautions: none    Subjective     Patient reports that she has recovered from falling out of brown at restaurant, notes [place] "was a really good work out"   CC still having difficulty getting shoes/socks on / off   (that her hip is 75% at this time)     Pain: 0/10 at rest and with household distance level surface ambulation     Objective   POD: 122  Measurements taken:  ( MMT: hip flex 3+/5, abd 4-/5, ER 4-/5, IR 3+/5)    received the following manual therapy techniques: Joint mobilizations to the hip were applied to restore full ROM and flexibility to assist with ADLs including dressing, bathing and shaving x  For 8  minutes.   Grade III - IV long axis distraction in multiple angles  Belt inf glide + ER and IR grade III-IV     Sveta received therapeutic exercises to develop strength, endurance and ROM for 30 minutes including:      ET x 10'   PROM - circumduction CW and CCW 1x20 each, hip IR / flex 1x15  Self stretch RF 1/2 kneeling 3x:15 R/L  Bridge marching 3x5    Supine hip flex with both feet on red PB + bridge 2x10 R/L  Hip hikers 3x15 red PB, yll box  R/L   L[place] 4" " "3x10  Arabesque 3x5 R/L  Plank with feet on red PB 5x:10    CP x 10'     Written Home Exercises Provided:  No changes to HEP  Pt demo good understanding of the education provided. Sveta demonstrated good return demonstration of activities.     Education provided:   pt also educated on benefits of using a stationary bike for hip health.  Sveta verbalized good understanding of education provided.   No spiritual or educational barriers to learning identified.    Assessment   Pt tolerated treatment without increase in sxs, unable to perform shin drags secondary to pain or 6" step LSU due to muscle weakness  Improving balance overall    Will the patient continue to benefit from skilled PT intervention? Yes        Medical necessity is demonstrated by:   - Pain limits function of effected part for all activities  - Unable to participate in daily activities   - Requires skilled supervision to complete and progress HEP  - Fall risk - impaired balance   - Continued inability to participate in vocational pursuits    Progress towards goals: good    Short Term Goals ( 4-6 Weeks):   - Decrease c/o pain at rest and with activity 50%   - Pt will increase ROM to 50% of CL hip   - Pt will ambulate independently without an AD  - Pt independent with HEP with progressions   - Able to wash face over sink without increase in hip pain      Long Term Goals ( 16-24 Weeks):  - Decrease c/o pain 100%  - Ascend/Descend 10 steps without AD without increase in hip pain/sxs  - Pt will increase % of CL limb for improved ability to perform ADLs and exercise  - Pt will increase strength of L hip EXT and ABD to 4+/5 for improved stability and motor control of the hip  - Pt to return to 100% full function         Plan    Continue with established Plan of Care towards PT goals with focus on decreasing pain, increasing ROM, strength, neuromuscular control and functional status   Continue with jt mobilization NV and focus on increasing hip " flex/core strength / stability     Therapist: Olivier Rand, PT

## 2018-07-12 ENCOUNTER — CLINICAL SUPPORT (OUTPATIENT)
Dept: REHABILITATION | Facility: HOSPITAL | Age: 33
End: 2018-07-12
Payer: COMMERCIAL

## 2018-07-12 DIAGNOSIS — M25.552 ACUTE HIP PAIN, LEFT: ICD-10-CM

## 2018-07-12 PROCEDURE — 97110 THERAPEUTIC EXERCISES: CPT | Performed by: PHYSICAL THERAPIST

## 2018-07-12 PROCEDURE — 97140 MANUAL THERAPY 1/> REGIONS: CPT | Performed by: PHYSICAL THERAPIST

## 2018-07-12 NOTE — PROGRESS NOTES
Physical Therapy Daily Note     Date: 07/12/2018     Name: Sveta Saez  Clinic Number: [id number]  Diagnosis: Left hip pain   Physician: Vanessa Montes PA-C    Evaluation Date: 3/14/2018  Date of Surgery: 3/13/2018  1. Hip arthroscopic labral repair, knotless FiberTak (CPT 46732)  2. Hip arthroscopic femoral neck osteoplasty (CPT 14991)  3. Hip arthroscopic partial synovectomy/debridement (CPT 77040)  4. Hip arthroscopic capsular closure  5. Hip arthroscopic-assisted arthrocentesis of viable structural human allograft tissue      matrix (BioDRestore, supralabral recess) (CPT 21459)  6. Hip fluoroscopic guidance for needle placement/localization (CPT 06145)  Return to MD Date: 6 weeks from 4/25/2018  Visit #: 16 of 20 ending 12/31/2018  Start Time: 700  Stop Time:  8:00  Total Treatment Time: 60'    Precautions: none    Subjective     Patient reports no new c/o this AM in L hip, feels she is making progress   (CC still having difficulty getting shoes/socks on / off )  (that her hip is 75% at this time)     Pain: 0/10 at rest and with household distance level surface ambulation     Objective   POD: 129 (approx 18 weeks post op)  Measurements taken:  ( MMT: hip flex 3+/5, abd 4-/5, ER 4-/5, IR 3+/5)    received the following manual therapy techniques: Joint mobilizations to the hip were applied to restore full ROM and flexibility to assist with ADLs including dressing, bathing and shaving x  For 8  minutes.     Grade III - IV long axis distraction in multiple angles  Belt inf glide + ER and IR grade III-IV     Sveta received therapeutic exercises to develop strength, endurance and ROM for 30 minutes including:      bike x 10'   PROM - circumduction CW and CCW 1x20 each, hip IR / flex 1x15  Self stretch RF supine, prone and 1/2 kneeling all  3x:15 R/L  B Bridge with back on wt bench 15# 3x10:05   Supine hip flex with both feet on green PB + bridge 2x10 R/L  Hip abd  band walks red loop band 3xburn R/L    CP x 10'     Written Home Exercises Provided:  No changes to HEP  Pt demo good understanding of the education provided. Sveta demonstrated good return demonstration of activities.     Education provided:   pt also educated on benefits of using a stationary bike for hip health.  Sveta verbalized good understanding of education provided.   No spiritual or educational barriers to learning identified.    Assessment   Pt tolerated treatment without increase in sxs, L hip weakness improving but still evident affecting ADLs including community distance ambulation level and unlevel surfaces and dressing     Will the patient continue to benefit from skilled PT intervention? Yes        Medical necessity is demonstrated by:   - Pain limits function of effected part for all activities  - Unable to participate in daily activities   - Requires skilled supervision to complete and progress HEP  - Fall risk - impaired balance   - Continued inability to participate in vocational pursuits    Progress towards goals: good    Short Term Goals ( 4-6 Weeks):   - Decrease c/o pain at rest and with activity 50%   - Pt will increase ROM to 50% of CL hip   - Pt will ambulate independently without an AD  - Pt independent with HEP with progressions   - Able to wash face over sink without increase in hip pain      Long Term Goals ( 16-24 Weeks):  - Decrease c/o pain 100%  - Ascend/Descend 10 steps without AD without increase in hip pain/sxs  - Pt will increase % of CL limb for improved ability to perform ADLs and exercise  - Pt will increase strength of L hip EXT and ABD to 4+/5 for improved stability and motor control of the hip  - Pt to return to 100% full function         Plan    Continue with established Plan of Care towards PT goals with focus on decreasing pain, increasing ROM, strength, neuromuscular control and functional status   Continue with jt mobilization NV and focus on increasing hip  flex/core strength / stability     Therapist: Olivier Rand, PT

## 2018-07-23 ENCOUNTER — PATIENT MESSAGE (OUTPATIENT)
Dept: SPORTS MEDICINE | Facility: CLINIC | Age: 33
End: 2018-07-23

## 2018-07-23 ENCOUNTER — TELEPHONE (OUTPATIENT)
Dept: SPORTS MEDICINE | Facility: CLINIC | Age: 33
End: 2018-07-23

## 2018-07-23 NOTE — TELEPHONE ENCOUNTER
Received FMLA paperwork from [employer]. Forms completed and faxed back to them at [phone].    Camille Lugo MA  [facility]

## 2018-07-26 ENCOUNTER — CLINICAL SUPPORT (OUTPATIENT)
Dept: REHABILITATION | Facility: HOSPITAL | Age: 33
End: 2018-07-26
Payer: COMMERCIAL

## 2018-07-26 DIAGNOSIS — M25.552 ACUTE HIP PAIN, LEFT: ICD-10-CM

## 2018-07-26 PROCEDURE — 97140 MANUAL THERAPY 1/> REGIONS: CPT | Performed by: PHYSICAL THERAPIST

## 2018-07-26 PROCEDURE — 97110 THERAPEUTIC EXERCISES: CPT | Performed by: PHYSICAL THERAPIST

## 2018-07-26 NOTE — PROGRESS NOTES
Physical Therapy Daily Note     Date: 07/26/2018     Name: Sveta Saez  Clinic Number: [id number]  Diagnosis: Left hip pain   Physician: Vanessa Montes PA-C    Evaluation Date: 3/14/2018  Date of Surgery: 3/13/2018  1. Hip arthroscopic labral repair, knotless FiberTak (CPT 46392)  2. Hip arthroscopic femoral neck osteoplasty (CPT 40602)  3. Hip arthroscopic partial synovectomy/debridement (CPT 08922)  4. Hip arthroscopic capsular closure  5. Hip arthroscopic-assisted arthrocentesis of viable structural human allograft tissue      matrix (BioDRestore, supralabral recess) (CPT 39362)  6. Hip fluoroscopic guidance for needle placement/localization (CPT 25409)  Return to MD Date: 6 weeks from 4/25/2018  Visit #: 17 of 20 ending 12/31/2018  Start Time: 700  Stop Time:  8:00  Total Treatment Time: 60'    Precautions: none    Subjective     Patient reports going to barre class with good result in L hip, however, has significant muscle soreness t/o hip and LE    (CC still having difficulty getting shoes/socks on / off )  (that her hip is 75% at this time)     Pain: 0/10 at rest and with household distance level surface ambulation     Objective   POD: 143 (approx 20 weeks post op)  Measurements taken:  ( MMT: hip flex 3+/5, abd 4-/5, ER 4-/5, IR 3+/5)    received the following manual therapy techniques: Joint mobilizations to the hip were applied to restore full ROM and flexibility to assist with ADLs including dressing, bathing and shaving x  For 8  minutes.     Grade III - IV long axis distraction in multiple angles  Belt inf glide + ER and IR grade III-IV     Sveta received therapeutic exercises to develop strength, endurance and ROM for 30 minutes including:      bike x 5'   PROM - circumduction CW and CCW 1x20 each, hip IR / flex 1x15  Self stretch RF supine, prone and manual supine  3x:15 R/L  Man HSS 3D 3x:15  Man glut max stretch 3x:15  Man piriformis stretch  3x;15  Supine butterfly stretch 3x:15  St quad stretch 3x;15  Leg swing FW and side 2x10 each  St MSR movement pattern R/L      Written Home Exercises Provided:  No changes to HEP pt to return to [place]  Pt demo good understanding of the education provided. Sveta demonstrated good return demonstration of activities.     Education provided:   pt also educated on benefits of using a stationary bike for hip health.  Sveta verbalized good understanding of education provided.   No spiritual or educational barriers to learning identified.    Assessment   Pt tolerated treatment without increase in sxs, unable to perform strengthening exercises or balance due to muscle soreness this AM     Will the patient continue to benefit from skilled PT intervention? Yes        Medical necessity is demonstrated by:   - Pain limits function of effected part for all activities  - Unable to participate in daily activities   - Requires skilled supervision to complete and progress HEP  - Fall risk - impaired balance   - Continued inability to participate in vocational pursuits    Progress towards goals: good    Short Term Goals ( 4-6 Weeks):   - Decrease c/o pain at rest and with activity 50%   - Pt will increase ROM to 50% of CL hip   - Pt will ambulate independently without an AD  - Pt independent with HEP with progressions   - Able to wash face over sink without increase in hip pain      Long Term Goals ( 16-24 Weeks):  - Decrease c/o pain 100%  - Ascend/Descend 10 steps without AD without increase in hip pain/sxs  - Pt will increase % of CL limb for improved ability to perform ADLs and exercise  - Pt will increase strength of L hip EXT and ABD to 4+/5 for improved stability and motor control of the hip  - Pt to return to 100% full function         Plan    Continue with established Plan of Care towards PT goals with focus on decreasing pain, increasing ROM, strength, neuromuscular control and functional status   Continue with jt  mobilization NV and focus on increasing hip flex/core strength / stability     Therapist: Olivier Rand, PT

## 2018-08-16 ENCOUNTER — CLINICAL SUPPORT (OUTPATIENT)
Dept: REHABILITATION | Facility: HOSPITAL | Age: 33
End: 2018-08-16
Payer: COMMERCIAL

## 2018-08-16 DIAGNOSIS — M25.552 ACUTE HIP PAIN, LEFT: ICD-10-CM

## 2018-08-16 PROCEDURE — 97140 MANUAL THERAPY 1/> REGIONS: CPT | Performed by: PHYSICAL THERAPIST

## 2018-08-16 PROCEDURE — 97110 THERAPEUTIC EXERCISES: CPT | Performed by: PHYSICAL THERAPIST

## 2018-08-16 NOTE — PROGRESS NOTES
"                                                  Physical Therapy Daily Note     Date: 08/16/2018     Name: Sveta Saez  Clinic Number: [id number]  Diagnosis: Left hip pain   Physician: Vanessa Montes PA-C    Evaluation Date: 3/14/2018  Date of Surgery: 3/13/2018  1. Hip arthroscopic labral repair, knotless FiberTak (CPT 70014)  2. Hip arthroscopic femoral neck osteoplasty (CPT 56147)  3. Hip arthroscopic partial synovectomy/debridement (CPT 25988)  4. Hip arthroscopic capsular closure  5. Hip arthroscopic-assisted arthrocentesis of viable structural human allograft tissue      matrix (BioDRestore, supralabral recess) (CPT 63523)  6. Hip fluoroscopic guidance for needle placement/localization (CPT 65230)  Return to MD Date: 6 weeks from 4/25/2018  Visit #: 18 of 20 ending 12/31/2018  Start Time: 700  Stop Time:  8:00  Total Treatment Time: 60'    Precautions: none    Subjective     Patient reports again continuing with barre class, was doing well until after class Saturday, had increased pain/soreness in  L hip with "pinching" with hip/trunk flexion     (CC still having difficulty getting shoes/socks on / off )  (that her hip is 75% at this time)     Pain: 2/10 at rest and with household distance level surface ambulation     Objective   POD: 164 (approx 23 weeks post op)  Measurements taken:  ( MMT: hip flex 3+/5, abd 4-/5, ER 4-/5, IR 3+/5)    received the following manual therapy techniques: Joint mobilizations to the hip were applied to restore full ROM and flexibility to assist with ADLs including dressing, bathing and shaving x  For 8  minutes.     Grade III - IV long axis distraction in multiple angles  Belt inf glide + ER and IR grade III-IV     Sveta received therapeutic exercises to develop strength, endurance and ROM for 30 minutes including:      bike x 5'   PROM - circumduction CW and CCW 1x20 each, hip IR / flex 1x15  Man stretch RF supine, prone  3x:15   Man and glut max stretch 3x:15  Man and self " "piriformis stretch in R S/L 3x;15  Supine butterfly stretch 3x:15        Written Home Exercises Provided:  Pt to cont with [place] class but will focus on flexibility / ROM at home   Pt demo good understanding of the education provided. Sveta demonstrated good return demonstration of activities.     Education provided:   pt also educated on benefits of using a stationary bike for hip health.  Sveta verbalized good understanding of education provided.   No spiritual or educational barriers to learning identified.    Assessment   Pt tolerated treatment without increase in sxs,  Decreased "pinching" post Rx      Will the patient continue to benefit from skilled PT intervention? Yes        Medical necessity is demonstrated by:   - Pain limits function of effected part for all activities  - Unable to participate in daily activities   - Requires skilled supervision to complete and progress HEP  - Fall risk - impaired balance   - Continued inability to participate in vocational pursuits    Progress towards goals: good    Short Term Goals ( 4-6 Weeks):   - Decrease c/o pain at rest and with activity 50%   - Pt will increase ROM to 50% of CL hip   - Pt will ambulate independently without an AD  - Pt independent with HEP with progressions   - Able to wash face over sink without increase in hip pain      Long Term Goals ( 16-24 Weeks):  - Decrease c/o pain 100%  - Ascend/Descend 10 steps without AD without increase in hip pain/sxs  - Pt will increase % of CL limb for improved ability to perform ADLs and exercise  - Pt will increase strength of L hip EXT and ABD to 4+/5 for improved stability and motor control of the hip  - Pt to return to 100% full function         Plan    Continue with established Plan of Care towards PT goals with focus on decreasing pain, increasing ROM, strength, neuromuscular control and functional status   Continue with jt mobilization NV and focus on increasing hip flex/core strength / stability "     Therapist: Olivier Rand, PT

## 2018-08-20 ENCOUNTER — OFFICE VISIT (OUTPATIENT)
Dept: ALLERGY | Facility: CLINIC | Age: 33
End: 2018-08-20
Payer: COMMERCIAL

## 2018-08-20 VITALS
SYSTOLIC BLOOD PRESSURE: 110 MMHG | HEIGHT: 64 IN | WEIGHT: 144.81 LBS | BODY MASS INDEX: 24.72 KG/M2 | DIASTOLIC BLOOD PRESSURE: 72 MMHG

## 2018-08-20 DIAGNOSIS — L30.9 CHRONIC DERMATITIS: ICD-10-CM

## 2018-08-20 DIAGNOSIS — Z96.642 HISTORY OF LEFT HIP REPLACEMENT: ICD-10-CM

## 2018-08-20 DIAGNOSIS — L30.9 DERMATITIS: Primary | ICD-10-CM

## 2018-08-20 PROCEDURE — 99999 PR PBB SHADOW E&M-EST. PATIENT-LVL III: CPT | Mod: PBBFAC,,, | Performed by: ALLERGY & IMMUNOLOGY

## 2018-08-20 PROCEDURE — 3008F BODY MASS INDEX DOCD: CPT | Mod: CPTII,S$GLB,, | Performed by: ALLERGY & IMMUNOLOGY

## 2018-08-20 PROCEDURE — 99204 OFFICE O/P NEW MOD 45 MIN: CPT | Mod: S$GLB,,, | Performed by: ALLERGY & IMMUNOLOGY

## 2018-08-20 RX ORDER — ONDANSETRON 4 MG/1
TABLET, FILM COATED ORAL
COMMUNITY
Start: 2018-07-25

## 2018-08-20 RX ORDER — OMEPRAZOLE 40 MG/1
CAPSULE, DELAYED RELEASE ORAL
COMMUNITY
Start: 2018-07-30

## 2018-08-30 ENCOUNTER — CLINICAL SUPPORT (OUTPATIENT)
Dept: REHABILITATION | Facility: HOSPITAL | Age: 33
End: 2018-08-30
Payer: COMMERCIAL

## 2018-08-30 DIAGNOSIS — M25.552 ACUTE HIP PAIN, LEFT: ICD-10-CM

## 2018-08-30 PROCEDURE — 97140 MANUAL THERAPY 1/> REGIONS: CPT | Performed by: PHYSICAL THERAPIST

## 2018-08-30 PROCEDURE — 97110 THERAPEUTIC EXERCISES: CPT | Performed by: PHYSICAL THERAPIST

## 2018-08-30 NOTE — PROGRESS NOTES
Physical Therapy Daily Note     Date: 08/30/2018     Name: Sveta Saez  Clinic Number: [id number]  Diagnosis: Left hip pain   Physician: Sawyer Landaverde III, *    Evaluation Date: 3/14/2018  Date of Surgery: 3/13/2018  1. Hip arthroscopic labral repair, knotless FiberTak (CPT 83858)  2. Hip arthroscopic femoral neck osteoplasty (CPT 15712)  3. Hip arthroscopic partial synovectomy/debridement (CPT 85364)  4. Hip arthroscopic capsular closure  5. Hip arthroscopic-assisted arthrocentesis of viable structural human allograft tissue      matrix (BioDRestore, supralabral recess) (CPT 51094)  6. Hip fluoroscopic guidance for needle placement/localization (CPT 36275)  Return to MD Date: 6 weeks from 4/25/2018  Visit #: 19 of 20 ending 12/31/2018  Start Time: 700  Stop Time:  8:00  Total Treatment Time: 60'  Total Billable time: 30'    Precautions: none    Subjective     Patient reports persistent CC still having difficulty getting shoes/socks on / off  (that her hip is 75% at this time)     Pain: 0/10 at rest and with household distance level surface ambulation     Objective   POD: 178  (approx 25 weeks post op)    Measurements taken:  MMT: hip flex 4/5, abd 4/5, ER 4-/5, IR 4/5 (improved)    received the following manual therapy techniques: Joint mobilizations to the hip were applied to restore full ROM and flexibility to assist with ADLs including dressing, bathing and shaving x  For 8  minutes.     Grade III - IV long axis distraction in multiple angles  Belt inf glide + ER and IR grade III-IV     Sveta received therapeutic exercises to develop strength, endurance and ROM for 30 minutes including:         PROM - circumduction CW and CCW 1x20 each, hip IR / flex 1x15  Man stretch RF supine  3x:15   Man and glut max stretch 3x:15  Man and self piriformis stretch in R S/L 3x;15  Supine butterfly stretch 3x:15  MR seated, supine 90-90 and prone hip ER/IR 2x15    Written  Home Exercises Provided:  Pt to cont with [place] class but will focus on flexibility / ROM at home   Pt demo good understanding of the education provided. Sveta demonstrated good return demonstration of activities.     Education provided:   pt also educated on benefits of using a stationary bike for hip health.  Sveta verbalized good understanding of education provided.   No spiritual or educational barriers to learning identified.    Assessment   Pt tolerated treatment with increase in sxs during [place] but not after, pt still limited with ADLs due to above but did present with increase in strength overall         Will the patient continue to benefit from skilled PT intervention? Yes        Medical necessity is demonstrated by:   - Pain limits function of effected part for all activities  - Unable to participate in daily activities   - Requires skilled supervision to complete and progress HEP  - Fall risk - impaired balance   - Continued inability to participate in vocational pursuits    Progress towards goals: good    Short Term Goals ( 4-6 Weeks):   - Decrease c/o pain at rest and with activity 50%   - Pt will increase ROM to 50% of CL hip   - Pt will ambulate independently without an AD  - Pt independent with HEP with progressions   - Able to wash face over sink without increase in hip pain      Long Term Goals ( 16-24 Weeks):  - Decrease c/o pain 100%  - Ascend/Descend 10 steps without AD without increase in hip pain/sxs  - Pt will increase % of CL limb for improved ability to perform ADLs and exercise  - Pt will increase strength of L hip EXT and ABD to 4+/5 for improved stability and motor control of the hip  - Pt to return to 100% full function         Plan    Continue with established Plan of Care towards PT goals with focus on decreasing pain, increasing ROM, strength, neuromuscular control and functional status   Continue with jt mobilization NV and focus on increasing hip flex/core strength /  stability     Therapist: Olivier Rand, PT

## 2018-09-12 ENCOUNTER — OFFICE VISIT (OUTPATIENT)
Dept: SPORTS MEDICINE | Facility: CLINIC | Age: 33
End: 2018-09-12
Payer: COMMERCIAL

## 2018-09-12 ENCOUNTER — PATIENT MESSAGE (OUTPATIENT)
Dept: SPORTS MEDICINE | Facility: CLINIC | Age: 33
End: 2018-09-12

## 2018-09-12 VITALS
BODY MASS INDEX: 24.59 KG/M2 | WEIGHT: 144 LBS | HEIGHT: 64 IN | HEART RATE: 69 BPM | SYSTOLIC BLOOD PRESSURE: 123 MMHG | DIASTOLIC BLOOD PRESSURE: 74 MMHG

## 2018-09-12 DIAGNOSIS — Z98.890 STATUS POST ARTHROSCOPY OF HIP: Primary | ICD-10-CM

## 2018-09-12 PROCEDURE — 3008F BODY MASS INDEX DOCD: CPT | Mod: CPTII,S$GLB,, | Performed by: ORTHOPAEDIC SURGERY

## 2018-09-12 PROCEDURE — 99212 OFFICE O/P EST SF 10 MIN: CPT | Mod: S$GLB,,, | Performed by: ORTHOPAEDIC SURGERY

## 2018-09-12 PROCEDURE — 99999 PR PBB SHADOW E&M-EST. PATIENT-LVL III: CPT | Mod: PBBFAC,,, | Performed by: ORTHOPAEDIC SURGERY

## 2018-09-12 NOTE — PROGRESS NOTES
"CC Left hip pain    HISTORY OF PRESENT ILLNESS:   Pt is here today for followup of her hip arthroscopy.  she is doing well.  We have reviewed her findings and discussed plan of care and future treatment options.    Feels 80% better vs preop, improving weekly          She notes that she still has pain when sitting "[place] style" crossing legs to put on socks and shoes     She finished PT at the [facility] location, she was working with Olivier  She notes doing her HEP every other day              DATE OF PROCEDURE: 3/13/2018  PROCEDURE:    Left:  1. Hip arthroscopic labral repair, knotless FiberTak (CPT 00932)  2. Hip arthroscopic femoral neck osteoplasty (CPT 57608)  3. Hip arthroscopic partial synovectomy/debridement (CPT 52223)  4. Hip arthroscopic capsular closure  5. Hip arthroscopic-assisted arthrocentesis of viable structural human allograft tissue      matrix (BioDRestore, supralabral recess) (CPT 41014)  6. Hip fluoroscopic guidance for needle placement/localization (CPT 03129)         Total of 3 anchors                        Review of Systems   Constitution: Negative. Negative for chills, fever and night sweats.   HENT: Negative for congestion and headaches.    Eyes: Negative for blurred vision, left vision loss and right vision loss.   Cardiovascular: Negative for chest pain and syncope.   Respiratory: Negative for cough and shortness of breath.    Endocrine: Negative for polydipsia, polyphagia and polyuria.   Hematologic/Lymphatic: Negative for bleeding problem. Does not bruise/bleed easily.   Skin: Negative for dry skin, itching and rash.   Musculoskeletal: Negative for falls and muscle weakness.   Gastrointestinal: Negative for abdominal pain and bowel incontinence.   Genitourinary: Negative for bladder incontinence and nocturia.   Neurological: Negative for disturbances in coordination, loss of balance and seizures.   Psychiatric/Behavioral: Negative for depression. The patient does not have " insomnia.    Allergic/Immunologic: Negative for hives and persistent infections.       PAST MEDICAL HISTORY:   Past Medical History:   Diagnosis Date    Abnormal Pap smear of cervix 2010    Allergy      PAST SURGICAL HISTORY:   Past Surgical History:   Procedure Laterality Date    ARTHROSCOPY-HIP Left 3/13/2018    [facility]    CAPSULAR CLOSURE Left 3/13/2018    [facility] at [facility]    DEBRIDEMENT Left 3/13/2018    [facility]    INJECTION-JOINT Left 2/1/2018    Performed by Phani Rondon MD at [facility] PAIN MGT    INJECTION-STEROID arthrscopic assisted Bio D injection to the left hip Left 3/13/2018    [facility] at [facility]    NO PAST SURGERIES      OSTEOPLASTY FEMORAL NECK Left 3/13/2018    [facility] at [facility]    PARTIAL SYNOVECTOMY-HIP C-ARM / Left 3/13/2018    [facility]    REPAIR-LABRAL Left 3/13/2018    [facility] at [facility]     FAMILY HISTORY:   Family History   Problem Relation Age of Onset    Breast cancer Maternal Uncle     Breast cancer Paternal Aunt     Cancer Paternal Uncle     Thyroid disease Mother     Breast cancer Maternal Aunt 82    Melanoma Neg Hx     Psoriasis Neg Hx     Lupus Neg Hx     Colon cancer Neg Hx     Ovarian cancer Neg Hx      SOCIAL HISTORY:   Social History     Socioeconomic History    Marital status:      Spouse name: Not on file    Number of children: 0    Years of education: Not on file    Highest education level: Not on file   Social Needs    Financial resource strain: Not on file    Food insecurity - worry: Not on file    Food insecurity - inability: Not on file    Transportation needs - medical: Not on file    Transportation needs - non-medical: Not on file   Occupational History    Occupation: [employer]     Employer: [employer]     Comment: prior    Tobacco Use    Smoking status: Never Smoker  "   Smokeless tobacco: Never Used   Substance and Sexual Activity    Alcohol use: Yes     Comment: social    Drug use: No    Sexual activity: Yes     Partners: Male     Birth control/protection: None   Other Topics Concern    Are you pregnant or think you may be? Not Asked    Breast-feeding Not Asked   Social History Narrative    Not on file       MEDICATIONS:   Current Outpatient Medications:     butalbital-aspirin-caffeine (BUTALBITAL COMPOUND) -40 mg Tab, Take 40 mg by mouth., Disp: , Rfl:     levocetirizine (XYZAL) 5 MG tablet, Take 5 mg by mouth every evening., Disp: , Rfl:     meclizine (ANTIVERT) 32 MG tablet, Take 25 mg by mouth 3 (three) times daily as needed., Disp: , Rfl:     meloxicam (MOBIC) 15 MG tablet, Take 1 tablet (15 mg total) by mouth once daily. Take 1 prilosec OTC daily, Disp: 60 tablet, Rfl: 2    omeprazole (PRILOSEC) 40 MG capsule, , Disp: , Rfl:     ondansetron (ZOFRAN) 4 MG tablet, , Disp: , Rfl:     traMADol (ULTRAM) 50 mg tablet, Take 1-2 tablets ( mg total) by mouth every 6 (six) hours as needed for Pain., Disp: 40 tablet, Rfl: 0  ALLERGIES: Review of patient's allergies indicates:  No Known Allergies    VITAL SIGNS: /74   Pulse 69   Ht 5' 4" (1.626 m)   Wt 65.3 kg (144 lb)   BMI 24.72 kg/m²                                           PHYSICAL EXAMINATION:     Incision sites healed well  No evidence of any erythema, infection or induration  Flexion ROM 0-120 degrees   IR without axial load:  40  IR with axial load: 30  ER: 45  abd: 55  Add: 15  No effusion  2+ DP pulse  No swelling, no calf tenderness        - tenderness: trochanteric bursa   + hip flexor TTP  Bridge: +  Step down: +                                                                           ASSESSMENT:                                                                                                                                               1. Status post above, doing well.  Hip flexor " tendonitis                                                                                                                             PLAN:                                                                                                                                                     1. Continue with HEP, restart PT  2. Emphasized core function.  3. I have discussed return to activity in detail.  4. he will see us back in 12 weeks with hip form.  5. All questions were answered and he should contact us if he  has any questions or concerns in the interim.

## 2018-09-17 ENCOUNTER — TELEPHONE (OUTPATIENT)
Dept: SPORTS MEDICINE | Facility: CLINIC | Age: 33
End: 2018-09-17

## 2018-09-17 NOTE — TELEPHONE ENCOUNTER
Received [employer] FMLA forms. Forms were completed and faxed to them at [phone].    Colleen Amedee MA [facility]

## 2018-10-01 ENCOUNTER — CLINICAL SUPPORT (OUTPATIENT)
Dept: REHABILITATION | Facility: HOSPITAL | Age: 33
End: 2018-10-01
Attending: ORTHOPAEDIC SURGERY
Payer: COMMERCIAL

## 2018-10-01 DIAGNOSIS — M25.652 DECREASED RANGE OF LEFT HIP MOVEMENT: ICD-10-CM

## 2018-10-01 DIAGNOSIS — M62.81 MUSCLE WEAKNESS OF LOWER EXTREMITY: ICD-10-CM

## 2018-10-01 DIAGNOSIS — M25.552 ACUTE HIP PAIN, LEFT: ICD-10-CM

## 2018-10-01 PROBLEM — M25.659 DECREASED RANGE OF MOTION OF HIP: Status: ACTIVE | Noted: 2018-10-01

## 2018-10-01 PROCEDURE — 97164 PT RE-EVAL EST PLAN CARE: CPT | Performed by: PHYSICAL THERAPIST

## 2018-10-01 PROCEDURE — 97110 THERAPEUTIC EXERCISES: CPT | Performed by: PHYSICAL THERAPIST

## 2018-10-01 NOTE — PROGRESS NOTES
Physical Therapy Re-Evaluation      Date: 10/01/2018     Name: Sveta Saez  Clinic Number: [id number]  Diagnosis: Left hip pain  Decreased range of motion   Decreased strength   Decreased functional status      Physician: Marivel Summers MD    Evaluation Date: 3/14/2018  Date of Surgery: 3/13/2018  1. Hip arthroscopic labral repair, knotless FiberTak (CPT 74495)  2. Hip arthroscopic femoral neck osteoplasty (CPT 89716)  3. Hip arthroscopic partial synovectomy/debridement (CPT 29395)  4. Hip arthroscopic capsular closure  5. Hip arthroscopic-assisted arthrocentesis of viable structural human allograft tissue      matrix (BioDRestore, supralabral recess) (CPT 95035)  6. Hip fluoroscopic guidance for needle placement/localization (CPT 93500)    Visit #: 20 overall (1 / 9  ending 12/31/2018)  Start Time: 14:30  Stop Time:  15:30  Total Billable time: 60'    Precautions: none    Subjective     Patient reports seeing  MD, recommended she return to formal PT at this time, pt cc at this time is continued   (that her hip is 75% at this time)     Pain: 0/10 at rest and with household distance level surface ambulation     Objective   POD: 178  (approx 25 weeks post op)    Measurements taken:  MMT: hip flex 4/5, abd 4/5, ER 4-/5, IR 4/5 (improved)    received the following manual therapy techniques: Joint mobilizations to the hip were applied to restore full ROM and flexibility to assist with ADLs including dressing, bathing and shaving x  For 8  minutes.     Grade III - IV long axis distraction in multiple angles  Belt inf glide + ER and IR grade III-IV     Sveta received therapeutic exercises to develop strength, endurance and ROM for 30 minutes including:         Man stretch RF supine  3x:15   PROM hip ER and IR seated at EOT 5xs  PROM hip ER supine 90-90 5xs  PROM hip ER prone 5xs  Self glut max stretch 5x:15  Supine butterfly stretch 3x:15  Rolling series 4D 1x5  Side sit  "hip abd developmental sequence 1x10 + MR R/L     Written Home Exercises Provided:  Pt to cont with [place] class, self stretch glut max and rolling series    Pt demo good understanding of the education provided. Sveta demonstrated good return demonstration of activities.     Education provided:   Sveta verbalized good understanding of education provided.   No spiritual or educational barriers to learning identified.    Assessment     Pt presents to PT with continued pain, loss of motion / flexibility, loss of strength, decreased functional status     Pt tolerated treatment with increase in sxs during stretching but good result with segmental rolling      Will the patient continue to benefit from skilled PT intervention? Yes        Medical necessity is demonstrated by:   - Pain limits function of effected part for all activities  - Unable to participate in daily activities   - Requires skilled supervision to complete and progress HEP  - Continued inability to participate in vocational pursuits    Progress towards goals: good    Short Term Goals ( 6 Weeks):    - Pt will increase ROM 10 degrees in hip ER to put shoes/ socks on    -pt will increase hip strength 1/2 muscle grade "   "       Long Term Goals ( 12 Weeks):  - Ascend/Descend 10 steps without AD without increase in hip pain/sxs  - Pt will increase ROM = to uninvolved hip to return to recreation / leisure activities   Pt will increase strength - to uninvolved hip  "  "        Plan      Continue with established Plan of Care towards PT goals with focus on decreasing pain, increasing ROM, strength, neuromuscular control and functional status   Focus on restoring full ROM /strength L hip     Therapist: Olivier Rand, PT    "

## 2018-10-13 ENCOUNTER — CLINICAL SUPPORT (OUTPATIENT)
Dept: REHABILITATION | Facility: HOSPITAL | Age: 33
End: 2018-10-13
Attending: ORTHOPAEDIC SURGERY
Payer: COMMERCIAL

## 2018-10-13 DIAGNOSIS — M62.81 MUSCLE WEAKNESS OF LOWER EXTREMITY: ICD-10-CM

## 2018-10-13 DIAGNOSIS — M25.652 DECREASED RANGE OF LEFT HIP MOVEMENT: Primary | ICD-10-CM

## 2018-10-13 DIAGNOSIS — M25.552 ACUTE HIP PAIN, LEFT: ICD-10-CM

## 2018-10-13 PROCEDURE — 97110 THERAPEUTIC EXERCISES: CPT | Performed by: PHYSICAL THERAPIST

## 2018-10-14 NOTE — PROGRESS NOTES
"                                                  Physical Therapy Re-Evaluation      Date: 10/14/2018     Name: Sveta Saez  Clinic Number: [id number]  Diagnosis: Left hip pain  Decreased range of motion   Decreased strength   Decreased functional status      Physician: Marivel Summers MD    Evaluation Date: 3/14/2018  Date of Surgery: 3/13/2018  1. Hip arthroscopic labral repair, knotless FiberTak (CPT 24680)  2. Hip arthroscopic femoral neck osteoplasty (CPT 70089)  3. Hip arthroscopic partial synovectomy/debridement (CPT 84401)  4. Hip arthroscopic capsular closure  5. Hip arthroscopic-assisted arthrocentesis of viable structural human allograft tissue      matrix (BioDRestore, supralabral recess) (CPT 28483)  6. Hip fluoroscopic guidance for needle placement/localization (CPT 54694)    Visit #: 21 overall (2 / 9  ending 12/31/2018)  Start Time: 10:00  Stop Time:  11:00  Total Billable time:  45'    Precautions: none    Subjective     Patient reports continued "pinching" with attempted positioning including sitting " style"   (Pt feels her hip is 75% at this time)     Pain: 0/10 at rest and with household distance level surface ambulation     Objective   POD: 190  (approx 27 weeks post op)    Measurements taken:  None taken this AM     ( MMT: hip flex 4/5, abd 4/5, ER 4-/5, IR 4/5 (improved))    Sveta received therapeutic exercises to develop strength, endurance and ROM for 45 minutes including:      Walk/jog warm up using 180 willie and mid foot strike 5 cycles :30/:30 on turf   Dynamic stretching routine   Inch worms 2x3  Leg swings FW 2x10 R/L and side 2x10 R/L      Written Home Exercises Provided:  Pt to cont with walk/jog program and dynamic stretching     Pt demo good understanding of the education provided. Sveta demonstrated good return demonstration of activities.     Education provided:   Sveta verbalized good understanding of education provided.   No spiritual or educational barriers to " "learning identified.    Assessment     Pt demonstrated a within session improvement in hip ROM following dynamic stretching routine, able to jog without pain in  L hip     Will the patient continue to benefit from skilled PT intervention? Yes        Medical necessity is demonstrated by:   - Pain limits function of effected part for all activities  - Unable to participate in daily activities   - Requires skilled supervision to complete and progress HEP  - Continued inability to participate in vocational pursuits    Progress towards goals: good    Short Term Goals ( 6 Weeks):    - Pt will increase ROM 10 degrees in hip ER to put shoes/ socks on    -pt will increase hip strength 1/2 muscle grade "   "       Long Term Goals ( 12 Weeks):  - Ascend/Descend 10 steps without AD without increase in hip pain/sxs  - Pt will increase ROM = to uninvolved hip to return to recreation / leisure activities   Pt will increase strength - to uninvolved hip  "  "        Plan      Continue with established Plan of Care towards PT goals with focus on decreasing pain, increasing ROM, strength, neuromuscular control and functional status   Focus on restoring full ROM /strength L hip     Therapist: Olivier Rand, PT    "

## 2018-10-24 ENCOUNTER — CLINICAL SUPPORT (OUTPATIENT)
Dept: REHABILITATION | Facility: HOSPITAL | Age: 33
End: 2018-10-24
Attending: ORTHOPAEDIC SURGERY
Payer: COMMERCIAL

## 2018-10-24 DIAGNOSIS — M25.552 ACUTE HIP PAIN, LEFT: ICD-10-CM

## 2018-10-24 DIAGNOSIS — M62.81 MUSCLE WEAKNESS OF LOWER EXTREMITY: ICD-10-CM

## 2018-10-24 DIAGNOSIS — M25.652 DECREASED RANGE OF LEFT HIP MOVEMENT: Primary | ICD-10-CM

## 2018-10-24 PROCEDURE — 97140 MANUAL THERAPY 1/> REGIONS: CPT | Performed by: PHYSICAL THERAPIST

## 2018-10-24 PROCEDURE — 97110 THERAPEUTIC EXERCISES: CPT | Performed by: PHYSICAL THERAPIST

## 2018-10-26 NOTE — PROGRESS NOTES
"                                                  Physical Therapy Re-Evaluation      Date:  10/24/2018      Name: Sveta Saez  Clinic Number: [id number]  Diagnosis: Left hip pain  Decreased range of motion   Decreased strength   Decreased functional status      Physician: Marivel Summers MD    Evaluation Date: 3/14/2018  Date of Surgery: 3/13/2018  1. Hip arthroscopic labral repair, knotless FiberTak (CPT 11321)  2. Hip arthroscopic femoral neck osteoplasty (CPT 67668)  3. Hip arthroscopic partial synovectomy/debridement (CPT 76802)  4. Hip arthroscopic capsular closure  5. Hip arthroscopic-assisted arthrocentesis of viable structural human allograft tissue      matrix (BioDRestore, supralabral recess) (CPT 51827)  6. Hip fluoroscopic guidance for needle placement/localization (CPT 74541)    Visit #: 22 overall (3 / 9  ending 12/31/2018)  Start Time: 15:30  Stop Time:  16:30  Total Billable time:  45'    Precautions: none    Subjective     Patient reports slight improvement in her overall L hip condition but still with "pinching" anteriorly    (Pt feels her hip is 75% at this time)     Pain: 0/10 at rest and with household distance level surface ambulation     Objective   POD: 201 (approx 28.5 weeks post op)    Measurements taken:  None taken this AM     ( MMT: hip flex 4/5, abd 4/5, ER 4-/5, IR 4/5 (improved))    received the following manual therapy techniques: Joint mobilizations were applied to the L hip   For 15  minutes.     Grade III-IV long axis distraction in IR  "  " inferior glide with IR  " " anterior glide with ER    Sveta received therapeutic exercises to develop strength, endurance and ROM for 30 minutes including:      PROM IR multiple positions  Self flexibility adductor, RF  Quad rock (child's pose) + band traction   1/2 kneeling hip flexor stretch + band distraction   Post on ground feet on red PB k' to chest 3x10  Psoas release technique       Written Home Exercises Provided:  Pt to cont with " "walk/jog program and dynamic stretching     Pt demo good understanding of the education provided. Sveta demonstrated good return demonstration of activities.     Education provided:   Sveta verbalized good understanding of education provided.   No spiritual or educational barriers to learning identified.    Assessment     maude Rx without increase in sxs, significant relief of pain with some stretching Swati and joint mobilization     Will the patient continue to benefit from skilled PT intervention? Yes        Medical necessity is demonstrated by:   - Pain limits function of effected part for all activities  - Unable to participate in daily activities   - Requires skilled supervision to complete and progress HEP  - Continued inability to participate in vocational pursuits    Progress towards goals: good    Short Term Goals ( 6 Weeks):    - Pt will increase ROM 10 degrees in hip ER to put shoes/ socks on    -pt will increase hip strength 1/2 muscle grade "   "       Long Term Goals ( 12 Weeks):  - Ascend/Descend 10 steps without AD without increase in hip pain/sxs  - Pt will increase ROM = to uninvolved hip to return to recreation / leisure activities   Pt will increase strength - to uninvolved hip  "  "        Plan      Continue with established Plan of Care towards PT goals with focus on decreasing pain, increasing ROM, strength, neuromuscular control and functional status   Focus on restoring full ROM /strength L hip     Therapist: Olivier Rand, PT    "

## 2018-11-03 ENCOUNTER — CLINICAL SUPPORT (OUTPATIENT)
Dept: REHABILITATION | Facility: HOSPITAL | Age: 33
End: 2018-11-03
Attending: ORTHOPAEDIC SURGERY
Payer: COMMERCIAL

## 2018-11-03 DIAGNOSIS — M25.552 ACUTE HIP PAIN, LEFT: ICD-10-CM

## 2018-11-03 DIAGNOSIS — M25.652 DECREASED RANGE OF LEFT HIP MOVEMENT: Primary | ICD-10-CM

## 2018-11-03 DIAGNOSIS — M62.81 MUSCLE WEAKNESS OF LOWER EXTREMITY: ICD-10-CM

## 2018-11-03 PROCEDURE — 97140 MANUAL THERAPY 1/> REGIONS: CPT | Performed by: PHYSICAL THERAPIST

## 2018-11-03 PROCEDURE — 97110 THERAPEUTIC EXERCISES: CPT | Performed by: PHYSICAL THERAPIST

## 2018-11-03 NOTE — PROGRESS NOTES
"                                                  Physical Therapy Re-Evaluation      Date:  11/3/2018      Name: Sveta Saez  Clinic Number: [id number]  Diagnosis: Left hip pain  Decreased range of motion   Decreased strength   Decreased functional status      Physician: Marivel Summers MD    Evaluation Date: 3/14/2018  Date of Surgery: 3/13/2018  1. Hip arthroscopic labral repair, knotless FiberTak (CPT 59024)  2. Hip arthroscopic femoral neck osteoplasty (CPT 45062)  3. Hip arthroscopic partial synovectomy/debridement (CPT 96837)  4. Hip arthroscopic capsular closure  5. Hip arthroscopic-assisted arthrocentesis of viable structural human allograft tissue      matrix (BioDRestore, supralabral recess) (CPT 11609)  6. Hip fluoroscopic guidance for needle placement/localization (CPT 18090)    Visit #: 23 overall (4 / 9  ending 12/31/2018)  Start Time:  8:00  Stop Time:   9:00  Total Billable time:  45'    Precautions: none    Subjective     Patient reports no new c/o this AM in L hip, "I still feel it here (IR and flexion)"     (Pt feels her hip is 75% at this time)     Pain: 0/10 at rest and with household distance level surface ambulation     Objective   POD: 211 (approx 28.75 weeks post op)    Measurements taken: continued capsular tightness flex/IR      ( MMT: hip flex 4/5, abd 4/5, ER 4-/5, IR 4/5 (improved))    received the following manual therapy techniques: Joint mobilizations were applied to the L hip   For 15  minutes.     Grade III-IV long axis distraction in IR  "  " inferior glide with IR  " " anterior glide with ER    Sveta received therapeutic exercises to develop strength, endurance and ROM for 30 minutes including:      Walk/jog 1' / 1' x 12' - 5 cycles   PROM IR multiple positions  Self flexibility adductor, RF  Prone piriformis stretch 5xs  Prone IR 90-90 hang 3'x2 1.5#  Movement prep : k' to chest, diag k' to chest, hip ER, hip IR, 3D lunge series, sumo stretch all 2 rounds    Written Home " "Exercises Provided:  Pt to cont with walk/jog program and dynamic stretching     Pt demo good understanding of the education provided. Sveta demonstrated good return demonstration of activities.     Education provided:   Sveta verbalized good understanding of education provided.   No spiritual or educational barriers to learning identified.    Assessment     maude Rx without increase in sxs, stretch / hurt noted with self and manual stretching program     Will the patient continue to benefit from skilled PT intervention? Yes        Medical necessity is demonstrated by:   - Pain limits function of effected part for all activities  - Unable to participate in daily activities   - Requires skilled supervision to complete and progress HEP  - Continued inability to participate in vocational pursuits    Progress towards goals: good    Short Term Goals ( 6 Weeks):    - Pt will increase ROM 10 degrees in hip ER to put shoes/ socks on    -pt will increase hip strength 1/2 muscle grade "   "       Long Term Goals ( 12 Weeks):  - Ascend/Descend 10 steps without AD without increase in hip pain/sxs  - Pt will increase ROM = to uninvolved hip to return to recreation / leisure activities   Pt will increase strength - to uninvolved hip  "  "        Plan      Continue with established Plan of Care towards PT goals with focus on decreasing pain, increasing ROM, strength, neuromuscular control and functional status   Focus on restoring full ROM /strength L hip     Therapist: Olivier Rand, PT    "

## 2018-11-08 ENCOUNTER — OFFICE VISIT (OUTPATIENT)
Dept: OBSTETRICS AND GYNECOLOGY | Facility: CLINIC | Age: 33
End: 2018-11-08
Attending: OBSTETRICS & GYNECOLOGY
Payer: COMMERCIAL

## 2018-11-08 VITALS
WEIGHT: 142.44 LBS | BODY MASS INDEX: 24.32 KG/M2 | DIASTOLIC BLOOD PRESSURE: 70 MMHG | HEIGHT: 64 IN | SYSTOLIC BLOOD PRESSURE: 110 MMHG

## 2018-11-08 DIAGNOSIS — Z01.419 WELL FEMALE EXAM WITH ROUTINE GYNECOLOGICAL EXAM: Primary | ICD-10-CM

## 2018-11-08 PROCEDURE — 99999 PR PBB SHADOW E&M-EST. PATIENT-LVL III: CPT | Mod: PBBFAC,,, | Performed by: OBSTETRICS & GYNECOLOGY

## 2018-11-08 PROCEDURE — 99395 PREV VISIT EST AGE 18-39: CPT | Mod: S$GLB,,, | Performed by: OBSTETRICS & GYNECOLOGY

## 2018-11-09 NOTE — PROGRESS NOTES
SUBJECTIVE:   33 y.o. female   for routine gyn exam. Patient's last menstrual period was 10/14/2018 (approximate)..  She has no unusual complaints.  Recovering from hip surgery.          Past Medical History:   Diagnosis Date    Abnormal Pap smear of cervix 2010    Allergy      Past Surgical History:   Procedure Laterality Date    ARTHROSCOPY-HIP Left 3/13/2018    [facility] by [facility] OR    CAPSULAR CLOSURE Left 3/13/2018    [facility]    DEBRIDEMENT Left 3/13/2018    [facility]    HIP SURGERY Left 2018    INJECTION-JOINT Left 2018    Performed by Phani Rondon [facility] at [facility] PAIN [facility]GT    INJECTION-STEROID arthrscopic assisted Bio D injection to the left hip Left 3/13/2018    [facility]    OSTEOPLASTY FEMORAL NECK Left 3/13/2018    [facility]    PARTIAL SYNOVECTOMY-HIP C-ARM / Left 3/13/2018    [facility]    REPAIR-LABRAL Left 3/13/2018    [facility]     Social History     Socioeconomic History    Marital status:      Spouse name: Not on file    Number of children: 0    Years of education: Not on file    Highest education level: Not on file   Social Needs    Financial resource strain: Not on file    Food insecurity - worry: Not on file    Food insecurity - inability: Not on file    Transportation needs - medical: Not on file    Transportation needs - non-medical: Not on file   Occupational History    Occupation: [employer]     Employer: [employer]     Comment: prior    Tobacco Use    Smoking status: Never Smoker    Smokeless tobacco: Never Used   Substance and Sexual Activity    Alcohol use: Yes     Comment: social    Drug use: No    Sexual activity: Yes     Partners: Male     Birth control/protection: None   Other Topics Concern    Are you pregnant or think you may be? Not Asked     Breast-feeding Not Asked   Social History Narrative    Not on file     Family History   Problem Relation Age of Onset    Breast cancer Maternal Uncle     Breast cancer Paternal Aunt     Cancer Paternal Uncle     Thyroid disease Mother     Breast cancer Maternal Aunt 82    Melanoma Neg Hx     Psoriasis Neg Hx     Lupus Neg Hx     Colon cancer Neg Hx     Ovarian cancer Neg Hx      OB History    Para Term  AB Living   0 0 0 0 0 0   SAB TAB Ectopic Multiple Live Births   0 0 0 0                 Current Outpatient Medications   Medication Sig Dispense Refill    butalbital-aspirin-caffeine (BUTALBITAL COMPOUND) -40 mg Tab Take 40 mg by mouth.      levocetirizine (XYZAL) 5 MG tablet Take 5 mg by mouth every evening.      meclizine (ANTIVERT) 32 MG tablet Take 25 mg by mouth 3 (three) times daily as needed.      meloxicam (MOBIC) 15 MG tablet Take 1 tablet (15 mg total) by mouth once daily. Take 1 prilosec OTC daily 60 tablet 2    omeprazole (PRILOSEC) 40 MG capsule       ondansetron (ZOFRAN) 4 MG tablet       traMADol (ULTRAM) 50 mg tablet Take 1-2 tablets ( mg total) by mouth every 6 (six) hours as needed for Pain. 40 tablet 0     No current facility-administered medications for this visit.      Allergies: Patient has no known allergies.     ROS:  Constitutional: no weight loss, weight gain, fever, fatigue  Eyes:  No vision changes, glasses/contacts  ENT/Mouth: No ulcers, sinus problems, ears ringing, headache  Cardiovascular: No inability to lie flat, chest pain, exercise intolerance, swelling, heart palpitations  Respiratory: No wheezing, coughing blood, shortness of breath, or cough  Gastrointestinal: No diarrhea, bloody stool, nausea/vomiting, constipation, gas, hemorrhoids  Genitourinary: No blood in urine, painful urination, urgency of urination, frequency of urination, incomplete emptying, incontinence, abnormal bleeding, painful periods, heavy periods, vaginal discharge,  "vaginal odor, painful intercourse, sexual problems, bleeding after intercourse.  Musculoskeletal: No muscle weakness  Skin/Breast: No painful breasts, nipple discharge, masses, rash, ulcers  Neurological: No passing out, seizures, numbness, headache  Endocrine: No diabetes, hypothyroid, hyperthyroid, hot flashes, hair loss, abnormal hair growth, ance  Psychiatric: No depression, crying  Hematologic: No bruises, bleeding, swollen lymph nodes, anemia.      OBJECTIVE:   The patient appears well, alert, oriented x 3, in no distress.  /70 (BP Location: Left arm, Patient Position: Sitting, BP Method: Medium (Manual))   Ht 5' 4" (1.626 m)   Wt 64.6 kg (142 lb 6.7 oz)   LMP 10/14/2018 (Approximate)   BMI 24.45 kg/m²   NECK: no thyromegaly, trachea midline  SKIN: no acne, striae, hirsutism  CHEST: CTAB  CV: RRR  BREAST EXAM: breasts appear normal, no suspicious masses, no skin or nipple changes or axillary nodes  ABDOMEN: no hernias, masses, or hepatosplenomegaly  GENITALIA: normal external genitalia, no erythema, no discharge  URETHRA: normal urethra, normal urethral meatus  VAGINA: Normal  CERVIX: no lesions or cervical motion tenderness  UTERUS: normal size, contour, position, consistency, mobility, non-tender  ADNEXA: no mass, fullness, tenderness      ASSESSMENT:   1. Well female exam with routine gynecological exam         PLAN:      Return to clinic in 1 year  "

## 2018-11-14 ENCOUNTER — CLINICAL SUPPORT (OUTPATIENT)
Dept: REHABILITATION | Facility: HOSPITAL | Age: 33
End: 2018-11-14
Attending: ORTHOPAEDIC SURGERY
Payer: COMMERCIAL

## 2018-11-14 DIAGNOSIS — M25.652 DECREASED RANGE OF LEFT HIP MOVEMENT: Primary | ICD-10-CM

## 2018-11-14 DIAGNOSIS — M25.552 ACUTE HIP PAIN, LEFT: ICD-10-CM

## 2018-11-14 DIAGNOSIS — M62.81 MUSCLE WEAKNESS OF LOWER EXTREMITY: ICD-10-CM

## 2018-11-14 PROCEDURE — 97530 THERAPEUTIC ACTIVITIES: CPT | Performed by: PHYSICAL THERAPIST

## 2018-11-14 PROCEDURE — 97140 MANUAL THERAPY 1/> REGIONS: CPT | Performed by: PHYSICAL THERAPIST

## 2018-11-14 NOTE — PROGRESS NOTES
"                                                  Physical Therapy Re-Evaluation      Date:  11/14/2018      Name: Sveta Saez  Clinic Number: [id number]  Diagnosis: Left hip pain  Decreased range of motion   Decreased strength   Decreased functional status      Physician: Marivel Summers MD    Evaluation Date: 3/14/2018  Date of Surgery: 3/13/2018  1. Hip arthroscopic labral repair, knotless FiberTak (CPT 95518)  2. Hip arthroscopic femoral neck osteoplasty (CPT 97712)  3. Hip arthroscopic partial synovectomy/debridement (CPT 31482)  4. Hip arthroscopic capsular closure  5. Hip arthroscopic-assisted arthrocentesis of viable structural human allograft tissue      matrix (BioDRestore, supralabral recess) (CPT 91265)  6. Hip fluoroscopic guidance for needle placement/localization (CPT 89370)    Visit #: 24 overall (5 / 9  ending 12/31/2018)  Start Time:  8:30  Stop Time:   9:30  Total Billable time:  45'    Precautions: none    Subjective     Patient reports improvement with self stretching   Pt feels her hip is 85% at this time    Pain: 0/10 at rest and with household distance level surface ambulation     Objective   POD: 222 (approx 31 weeks post op)    Measurements taken: continued capsular tightness flex/IR      ( MMT: hip flex 4/5, abd 4/5, ER 4-/5, IR 4/5 (improved))    received the following manual therapy techniques: Joint mobilizations were applied to the L hip   For 15  minutes.     Grade III-IV long axis distraction in IR  "  " inferior glide with IR  " " anterior glide with ER    Patient participated in dynamic functional therapeutic activities to improve functional performance for  30 minutes. Including:     PROM IR multiple positions  Self stretch into hip flex/IR in quadruped  Movement prep : k' to chest, diag k' to chest, hip ER, hip IR, 3D lunge series, sumo stretch all 2 rounds  Single leg squat + 1 black sport cord 3xburn R/L  Double blue sport cord lunge forward onto green box 3x15 R/L  Lat hop "  " ""  3x15 R/L    CP x 10'     Written Home Exercises Provided:  Pt to cont with progressive self ROM/stretching program in L hip     Pt demo good understanding of the education provided. Sveta demonstrated good return demonstration of activities.     Education provided:   Sveta verbalized good understanding of education provided.   No spiritual or educational barriers to learning identified.    Assessment     maude Rx without increase in sxs, improving overall in terms of L hip recovery    Will the patient continue to benefit from skilled PT intervention? Yes        Medical necessity is demonstrated by:   - Pain limits function of effected part for all activities  - Unable to participate in daily activities   - Requires skilled supervision to complete and progress HEP  - Continued inability to participate in vocational pursuits    Progress towards goals: good    Short Term Goals ( 6 Weeks):    - Pt will increase ROM 10 degrees in hip ER to put shoes/ socks on    -pt will increase hip strength 1/2 muscle grade "   "       Long Term Goals ( 12 Weeks):  - Ascend/Descend 10 steps without AD without increase in hip pain/sxs  - Pt will increase ROM = to uninvolved hip to return to recreation / leisure activities   Pt will increase strength - to uninvolved hip  "  "        Plan      Continue with established Plan of Care towards PT goals with focus on decreasing pain, increasing ROM, strength, neuromuscular control and functional status   Focus on restoring full ROM /strength L hip     Therapist: Olivier Rand, PT    "

## 2018-11-28 ENCOUNTER — CLINICAL SUPPORT (OUTPATIENT)
Dept: REHABILITATION | Facility: HOSPITAL | Age: 33
End: 2018-11-28
Attending: ORTHOPAEDIC SURGERY
Payer: COMMERCIAL

## 2018-11-28 DIAGNOSIS — M62.81 MUSCLE WEAKNESS OF LOWER EXTREMITY: ICD-10-CM

## 2018-11-28 DIAGNOSIS — M25.552 ACUTE HIP PAIN, LEFT: ICD-10-CM

## 2018-11-28 DIAGNOSIS — M25.652 DECREASED RANGE OF LEFT HIP MOVEMENT: Primary | ICD-10-CM

## 2018-11-28 PROCEDURE — 97530 THERAPEUTIC ACTIVITIES: CPT | Performed by: PHYSICAL THERAPIST

## 2018-11-28 NOTE — PROGRESS NOTES
Physical Therapy Re-Evaluation      Date:  11/28/2018      Name: Sveta Saez  Clinic Number: [id number]  Diagnosis: Left hip pain  Decreased range of motion   Decreased strength   Decreased functional status      Physician: Marivel Summers MD    Evaluation Date: 3/14/2018  Date of Surgery: 3/13/2018  1. Hip arthroscopic labral repair, knotless FiberTak (CPT 94491)  2. Hip arthroscopic femoral neck osteoplasty (CPT 34237)  3. Hip arthroscopic partial synovectomy/debridement (CPT 39431)  4. Hip arthroscopic capsular closure  5. Hip arthroscopic-assisted arthrocentesis of viable structural human allograft tissue      matrix (BioDRestore, supralabral recess) (CPT 53867)  6. Hip fluoroscopic guidance for needle placement/localization (CPT 94694)    Visit #: 25 overall (6 / 9  ending 12/31/2018)  Start Time:  8:00  Stop Time:   9:00  Total Billable time:  45'    Precautions: none    Subjective     Patient significant improvement in hip mobility with self stretching   (Pt feels her hip is 85% at this time)    Pain: 0/10 at rest and with household distance level surface ambulation     Objective   POD: 236 (approx 33 weeks post op)    Measurements taken:  Sanchez HIRSCH (knee one fist width from table) (improved ROM)     ( MMT: hip flex 4/5, abd 4/5, ER 4-/5, IR 4/5 (improved))    received the following manual therapy techniques: Joint mobilizations were applied to the L hip   For 8  minutes.     Grade III-IV long axis distraction in IR    Patient participated in dynamic functional therapeutic activities to improve functional performance for  45  minutes. Including:     Dynamic warm up   Movement prep : k' to chest, diag k' to chest, hip ER, hip IR, 3D lunge series, sumo stretch all 2 rounds  Shuttle jump series:  B jumps 2b 3x15  U jumps 1b 3x10  alt jumps 1b 2x10  Ladder series 5D x 2   PROM hip all directions     CP x 10'     Written Home Exercises Provided:  Pt to cont with  "progressive self ROM/stretching program in L hip     Pt demo good understanding of the education provided. Sveta demonstrated good return demonstration of activities.     Education provided:   Sveta verbalized good understanding of education provided.   No spiritual or educational barriers to learning identified.    Assessment     maude Rx without increase in sxs, tolerated beginning of functional exercise progression   Pt still demonstrates decreased functional status but ROM/ flexibility is improving     Will the patient continue to benefit from skilled PT intervention? Yes        Medical necessity is demonstrated by:   - Pain limits function of effected part for all activities  - Unable to participate in daily activities   - Requires skilled supervision to complete and progress HEP  - Continued inability to participate in vocational pursuits    Progress towards goals: good    Short Term Goals ( 6 Weeks):    - Pt will increase ROM 10 degrees in hip ER to put shoes/ socks on    -pt will increase hip strength 1/2 muscle grade "   "       Long Term Goals ( 12 Weeks):  - Ascend/Descend 10 steps without AD without increase in hip pain/sxs  - Pt will increase ROM = to uninvolved hip to return to recreation / leisure activities   Pt will increase strength - to uninvolved hip  "  "        Plan      Continue with established Plan of Care towards PT goals with focus on decreasing pain, increasing ROM, strength, neuromuscular control and functional status   Focus on restoring full ROM /strength L hip     Therapist: Olivier Rand, PT    "

## 2018-12-05 ENCOUNTER — OFFICE VISIT (OUTPATIENT)
Dept: SPORTS MEDICINE | Facility: CLINIC | Age: 33
End: 2018-12-05
Payer: COMMERCIAL

## 2018-12-05 VITALS
DIASTOLIC BLOOD PRESSURE: 69 MMHG | HEART RATE: 92 BPM | BODY MASS INDEX: 24.24 KG/M2 | HEIGHT: 64 IN | WEIGHT: 142 LBS | SYSTOLIC BLOOD PRESSURE: 120 MMHG

## 2018-12-05 DIAGNOSIS — Z98.890 STATUS POST ARTHROSCOPY OF HIP: Primary | ICD-10-CM

## 2018-12-05 PROCEDURE — 3008F BODY MASS INDEX DOCD: CPT | Mod: CPTII,S$GLB,, | Performed by: ORTHOPAEDIC SURGERY

## 2018-12-05 PROCEDURE — 99999 PR PBB SHADOW E&M-EST. PATIENT-LVL III: CPT | Mod: PBBFAC,,, | Performed by: ORTHOPAEDIC SURGERY

## 2018-12-05 PROCEDURE — 99212 OFFICE O/P EST SF 10 MIN: CPT | Mod: S$GLB,,, | Performed by: ORTHOPAEDIC SURGERY

## 2018-12-05 NOTE — PROGRESS NOTES
"CC Left hip pain    HISTORY OF PRESENT ILLNESS:   Pt is here today for followup of her hip arthroscopy.  she is doing well.  We have reviewed her findings and discussed plan of care and future treatment options.    Feels 90% better vs preop.          She notes that she still has pain when sitting " style" crossing legs. Particularly during yoga, but has improved since last follow-up.    She just finished PT at the [facility] location, she was working with Olivier. Restarted PT 3 months ago.  She notes doing her HEP every other day              DATE OF PROCEDURE: 3/13/2018  PROCEDURE:    Left:  1. Hip arthroscopic labral repair, knotless FiberTak (CPT 59786)  2. Hip arthroscopic femoral neck osteoplasty (CPT 13023)  3. Hip arthroscopic partial synovectomy/debridement (CPT 30049)  4. Hip arthroscopic capsular closure  5. Hip arthroscopic-assisted arthrocentesis of viable structural human allograft tissue      matrix (BioDRestore, supralabral recess) (CPT 46652)  6. Hip fluoroscopic guidance for needle placement/localization (CPT 64126)         Total of 3 anchors                        Review of Systems   Constitution: Negative. Negative for chills, fever and night sweats.   HENT: Negative for congestion and headaches.    Eyes: Negative for blurred vision, left vision loss and right vision loss.   Cardiovascular: Negative for chest pain and syncope.   Respiratory: Negative for cough and shortness of breath.    Endocrine: Negative for polydipsia, polyphagia and polyuria.   Hematologic/Lymphatic: Negative for bleeding problem. Does not bruise/bleed easily.   Skin: Negative for dry skin, itching and rash.   Musculoskeletal: Negative for falls and muscle weakness.   Gastrointestinal: Negative for abdominal pain and bowel incontinence.   Genitourinary: Negative for bladder incontinence and nocturia.   Neurological: Negative for disturbances in coordination, loss of balance and seizures.   Psychiatric/Behavioral: " Negative for depression. The patient does not have insomnia.    Allergic/Immunologic: Negative for hives and persistent infections.       PAST MEDICAL HISTORY:   Past Medical History:   Diagnosis Date    Abnormal Pap smear of cervix 2010    Allergy      PAST SURGICAL HISTORY:   Past Surgical History:   Procedure Laterality Date    ARTHROSCOPY-HIP Left 3/13/2018    [facility]    CAPSULAR CLOSURE Left 3/13/2018    [facility] at [facility]    DEBRIDEMENT Left 3/13/2018    [facility]    HIP SURGERY Left 03/13/2018    INJECTION-JOINT Left 2/1/2018    [facility] Phani [facility]ondon [facility] at [facility] PAIN MGT    INJECTION-STEROID arthrscopic assisted Bio D injection to the left hip Left 3/13/2018    [facility] at [facility]    OSTEOPLASTY FEMORAL NECK Left 3/13/2018    [facility] at [facility]    PARTIAL SYNOVECTOMY-HIP C-ARM / Left 3/13/2018    [facility] at [facility]    REPAIR-LABRAL Left 3/13/2018    [facility] at [facility]     FAMILY HISTORY:   Family History   Problem Relation Age of Onset    Breast cancer Maternal Uncle     Breast cancer Paternal Aunt     Cancer Paternal Uncle     Thyroid disease Mother     Breast cancer Maternal Aunt 82    Melanoma Neg Hx     Psoriasis Neg Hx     Lupus Neg Hx     Colon cancer Neg Hx     Ovarian cancer Neg Hx      SOCIAL HISTORY:   Social History     Socioeconomic History    Marital status:      Spouse name: Not on file    Number of children: 0    Years of education: Not on file    Highest education level: Not on file   Social Needs    Financial resource strain: Not on file    Food insecurity - worry: Not on file    Food insecurity - inability: Not on file    Transportation needs - medical: Not on file    Transportation needs - non-medical: Not on file   Occupational History    Occupation: [employer]     Employer: [employer]     Comment:  "prior    Tobacco Use    Smoking status: Never Smoker    Smokeless tobacco: Never Used   Substance and Sexual Activity    Alcohol use: Yes     Comment: social    Drug use: No    Sexual activity: Yes     Partners: Male     Birth control/protection: None   Other Topics Concern    Are you pregnant or think you may be? Not Asked    Breast-feeding Not Asked   Social History Narrative    Not on file       MEDICATIONS:   Current Outpatient Medications:     butalbital-aspirin-caffeine (BUTALBITAL COMPOUND) -40 mg Tab, Take 40 mg by mouth., Disp: , Rfl:     levocetirizine (XYZAL) 5 MG tablet, Take 5 mg by mouth every evening., Disp: , Rfl:     meclizine (ANTIVERT) 32 MG tablet, Take 25 mg by mouth 3 (three) times daily as needed., Disp: , Rfl:     ondansetron (ZOFRAN) 4 MG tablet, , Disp: , Rfl:     meloxicam (MOBIC) 15 MG tablet, Take 1 tablet (15 mg total) by mouth once daily. Take 1 prilosec OTC daily, Disp: 60 tablet, Rfl: 2    omeprazole (PRILOSEC) 40 MG capsule, , Disp: , Rfl:     traMADol (ULTRAM) 50 mg tablet, Take 1-2 tablets ( mg total) by mouth every 6 (six) hours as needed for Pain., Disp: 40 tablet, Rfl: 0  ALLERGIES: Review of patient's allergies indicates:  No Known Allergies    VITAL SIGNS: /69   Pulse 92   Ht 5' 4" (1.626 m)   Wt 64.4 kg (142 lb)   BMI 24.37 kg/m²                                           PHYSICAL EXAMINATION:     Incision sites healed well  No evidence of any erythema, infection or induration  Flexion ROM 0-120 degrees   IR without axial load:  40  IR with axial load: 30 (mild pain with maximal internal rotation)  ER: 45   abd: 55  Add: 15  No effusion  2+ DP pulse  No swelling, no calf tenderness        - tenderness: trochanteric bursa   - TTP over hip flexors  Step down: +                                                                           ASSESSMENT:                                                                                           "                                                     1. Status post above, doing well.  Hip flexor tendonitis                                                                                                                             PLAN:                                                                                                                                                     1. Continue with HEP, discontinue PT  2. Emphasized core function.  3. Hip form completed today  4. Follow-up in 4 months with repeat x-rays, then follow-up yearly with x-rays.  5. All questions were answered and he should contact us if he  has any questions or concerns in the interim.                         6. Moving to [place] in January.

## 2018-12-18 ENCOUNTER — LAB VISIT (OUTPATIENT)
Dept: LAB | Facility: HOSPITAL | Age: 33
End: 2018-12-18
Attending: FAMILY MEDICINE
Payer: COMMERCIAL

## 2018-12-18 ENCOUNTER — OFFICE VISIT (OUTPATIENT)
Dept: INTERNAL MEDICINE | Facility: CLINIC | Age: 33
End: 2018-12-18
Attending: FAMILY MEDICINE
Payer: COMMERCIAL

## 2018-12-18 VITALS
OXYGEN SATURATION: 99 % | WEIGHT: 141 LBS | HEIGHT: 64 IN | SYSTOLIC BLOOD PRESSURE: 116 MMHG | BODY MASS INDEX: 24.07 KG/M2 | TEMPERATURE: 98 F | HEART RATE: 85 BPM | DIASTOLIC BLOOD PRESSURE: 80 MMHG

## 2018-12-18 DIAGNOSIS — K59.00 CONSTIPATION, UNSPECIFIED CONSTIPATION TYPE: ICD-10-CM

## 2018-12-18 DIAGNOSIS — Z00.00 ANNUAL PHYSICAL EXAM: Primary | ICD-10-CM

## 2018-12-18 DIAGNOSIS — Z00.00 ANNUAL PHYSICAL EXAM: ICD-10-CM

## 2018-12-18 DIAGNOSIS — K60.2 ANAL FISSURE: ICD-10-CM

## 2018-12-18 PROBLEM — R52 PAIN: Status: RESOLVED | Noted: 2018-02-01 | Resolved: 2018-12-18

## 2018-12-18 PROBLEM — M25.552 ACUTE HIP PAIN, LEFT: Status: RESOLVED | Noted: 2018-03-14 | Resolved: 2018-12-18

## 2018-12-18 LAB
ANION GAP SERPL CALC-SCNC: 7 MMOL/L
BUN SERPL-MCNC: 12 MG/DL
CALCIUM SERPL-MCNC: 8.9 MG/DL
CHLORIDE SERPL-SCNC: 106 MMOL/L
CHOLEST SERPL-MCNC: 198 MG/DL
CHOLEST/HDLC SERPL: 2.4 {RATIO}
CO2 SERPL-SCNC: 26 MMOL/L
CREAT SERPL-MCNC: 0.8 MG/DL
EST. GFR  (AFRICAN AMERICAN): >60 ML/MIN/1.73 M^2
EST. GFR  (NON AFRICAN AMERICAN): >60 ML/MIN/1.73 M^2
GLUCOSE SERPL-MCNC: 75 MG/DL
HDLC SERPL-MCNC: 83 MG/DL
HDLC SERPL: 41.9 %
LDLC SERPL CALC-MCNC: 103.2 MG/DL
NONHDLC SERPL-MCNC: 115 MG/DL
POTASSIUM SERPL-SCNC: 4 MMOL/L
SODIUM SERPL-SCNC: 139 MMOL/L
TRIGL SERPL-MCNC: 59 MG/DL
TSH SERPL DL<=0.005 MIU/L-ACNC: 1.5 UIU/ML

## 2018-12-18 PROCEDURE — 84443 ASSAY THYROID STIM HORMONE: CPT

## 2018-12-18 PROCEDURE — 99999 PR PBB SHADOW E&M-EST. PATIENT-LVL III: CPT | Mod: PBBFAC,,, | Performed by: FAMILY MEDICINE

## 2018-12-18 PROCEDURE — 36415 COLL VENOUS BLD VENIPUNCTURE: CPT

## 2018-12-18 PROCEDURE — 80061 LIPID PANEL: CPT

## 2018-12-18 PROCEDURE — 99395 PREV VISIT EST AGE 18-39: CPT | Mod: S$GLB,,, | Performed by: FAMILY MEDICINE

## 2018-12-18 PROCEDURE — 80048 BASIC METABOLIC PNL TOTAL CA: CPT

## 2018-12-18 RX ORDER — POLYETHYLENE GLYCOL 3350 17 G/17G
17 POWDER, FOR SOLUTION ORAL DAILY PRN
Qty: 510 G | Refills: 1 | Status: SHIPPED | OUTPATIENT
Start: 2018-12-18

## 2018-12-18 RX ORDER — DOCUSATE SODIUM 100 MG/1
100 CAPSULE, LIQUID FILLED ORAL 2 TIMES DAILY
Qty: 60 CAPSULE | Refills: 0 | Status: SHIPPED | OUTPATIENT
Start: 2018-12-18

## 2018-12-18 RX ORDER — SENNOSIDES 25 MG/1
1 TABLET, FILM COATED ORAL 4 TIMES DAILY PRN
Qty: 28.3 G | Refills: 1 | Status: SHIPPED | OUTPATIENT
Start: 2018-12-18

## 2018-12-18 NOTE — PROGRESS NOTES
Subjective:       Patient ID: Sveta Saez is a 33 y.o. female.    Chief Complaint: Annual Exam    Established patient for an annual wellness check/physical exam and also chronic disease management. Specific complaints - see dictation and please see ROS.  P, S, Fm, Soc Hx's; Meds, allergies reviewed and reconciled.  Health maintenance file reviewed and addressed items due.      Review of Systems   Constitutional: Negative for activity change, chills, fatigue, fever and unexpected weight change.   HENT: Negative for congestion, hearing loss, rhinorrhea and trouble swallowing.    Eyes: Negative for discharge, redness and visual disturbance.   Respiratory: Negative for cough, chest tightness, shortness of breath and wheezing.    Cardiovascular: Negative for chest pain, palpitations and leg swelling.   Gastrointestinal: Positive for blood in stool and constipation. Negative for abdominal pain, diarrhea and vomiting.   Endocrine: Negative for polydipsia and polyuria.   Genitourinary: Negative for difficulty urinating, dysuria, hematuria and menstrual problem.   Musculoskeletal: Negative for arthralgias, back pain, gait problem, joint swelling, myalgias and neck pain.   Skin: Negative for color change and rash.   Neurological: Negative for tremors, speech difficulty, weakness, numbness and headaches.   Hematological: Negative for adenopathy. Does not bruise/bleed easily.   Psychiatric/Behavioral: Negative for behavioral problems, confusion, dysphoric mood and sleep disturbance. The patient is not nervous/anxious.        Objective:      Physical Exam   Constitutional: She is oriented to person, place, and time. She appears well-developed and well-nourished.   HENT:   Head: Normocephalic.   Right Ear: Tympanic membrane, external ear and ear canal normal.   Left Ear: Tympanic membrane, external ear and ear canal normal.   Mouth/Throat: Oropharynx is clear and moist.   Eyes: Pupils are equal, round, and reactive to light.  No scleral icterus.   Neck: Normal range of motion. Neck supple. Carotid bruit is not present. No thyromegaly present.   Cardiovascular: Normal rate, regular rhythm, normal heart sounds and intact distal pulses. Exam reveals no gallop and no friction rub.   No murmur heard.  Pulmonary/Chest: Effort normal and breath sounds normal. She exhibits no tenderness.   Abdominal: Soft. She exhibits no distension and no mass. There is no tenderness. There is no rebound and no guarding.   Anal fissure noted @ 6:00 and possible 3:00   Musculoskeletal: Normal range of motion. She exhibits no edema or tenderness.   Lymphadenopathy:     She has no cervical adenopathy.   Neurological: She is alert and oriented to person, place, and time. She has normal strength. She displays normal reflexes. No cranial nerve deficit or sensory deficit. Coordination and gait normal.   Skin: Skin is warm and dry. No rash noted.   Psychiatric: She has a normal mood and affect. Her behavior is normal. Judgment and thought content normal.   Nursing note and vitals reviewed.      Assessment:       1. Annual physical exam    2. Constipation, unspecified constipation type    3. Anal fissure        Plan:   Sveta was seen today for annual exam.    Diagnoses and all orders for this visit:    Annual physical exam  -     TSH; Future  -     Basic metabolic panel; Standing  -     Lipid panel; Standing    Constipation, unspecified constipation type    Anal fissure    Other orders  -     lidocaine 5 % Crea; Apply 1 application topically 4 (four) times daily as needed.  -     docusate sodium (COLACE) 100 MG capsule; Take 1 capsule (100 mg total) by mouth 2 (two) times daily.  -     polyethylene glycol (GLYCOLAX) 17 gram/dose powder; Take 17 g by mouth daily as needed.      See meds, orders, follow up, routing and instructions sections of encounter.  A 33-year-old annual physical examination.  Two-week history of blood with   wiping and also anal pain, some  constipation, BM every 2-3 days.    Nancy present, exam revealed an anal fissure.  Conservative care discussed.    Hydration.  See orders.  Call me in two weeks.  If this continues, consider CRS   consult.      TIM/HN  dd: 12/18/2018 16:47:17 (CST)  td: 12/19/2018 12:05:27 (CST)  Doc ID   #[id number]  Job ID #[id number]    CC:

## 2019-02-14 ENCOUNTER — PATIENT MESSAGE (OUTPATIENT)
Dept: SPORTS MEDICINE | Facility: CLINIC | Age: 34
End: 2019-02-14

## 2019-03-19 ENCOUNTER — PATIENT MESSAGE (OUTPATIENT)
Dept: SPORTS MEDICINE | Facility: CLINIC | Age: 34
End: 2019-03-19

## 2020-04-06 ENCOUNTER — PATIENT MESSAGE (OUTPATIENT)
Dept: SPORTS MEDICINE | Facility: CLINIC | Age: 35
End: 2020-04-06

## 2020-10-05 ENCOUNTER — PATIENT MESSAGE (OUTPATIENT)
Dept: ADMINISTRATIVE | Facility: HOSPITAL | Age: 35
End: 2020-10-05

## 2021-01-04 ENCOUNTER — PATIENT MESSAGE (OUTPATIENT)
Dept: ADMINISTRATIVE | Facility: HOSPITAL | Age: 36
End: 2021-01-04

## 2021-04-05 ENCOUNTER — PATIENT MESSAGE (OUTPATIENT)
Dept: ADMINISTRATIVE | Facility: HOSPITAL | Age: 36
End: 2021-04-05

## 2021-07-06 ENCOUNTER — PATIENT MESSAGE (OUTPATIENT)
Dept: ADMINISTRATIVE | Facility: HOSPITAL | Age: 36
End: 2021-07-06

## 2021-08-04 ENCOUNTER — APPOINTMENT (RX ONLY)
Dept: URBAN - METROPOLITAN AREA CLINIC 88 | Facility: CLINIC | Age: 36
Setting detail: DERMATOLOGY
End: 2021-08-04

## 2021-08-04 DIAGNOSIS — L29.89 OTHER PRURITUS: ICD-10-CM

## 2021-08-04 DIAGNOSIS — R20.2 PARESTHESIA OF SKIN: ICD-10-CM

## 2021-08-04 DIAGNOSIS — L29.8 OTHER PRURITUS: ICD-10-CM

## 2021-08-04 PROCEDURE — 99203 OFFICE O/P NEW LOW 30 MIN: CPT

## 2021-08-04 PROCEDURE — ? PRESCRIPTION

## 2021-08-04 PROCEDURE — ? TREATMENT REGIMEN

## 2021-08-04 PROCEDURE — ? COUNSELING

## 2021-08-04 RX ORDER — CLOBETASOL PROPIONATE 0.5 MG/G
CREAM TOPICAL
Qty: 1 | Refills: 1 | Status: ERX | COMMUNITY
Start: 2021-08-04

## 2021-08-04 RX ADMIN — CLOBETASOL PROPIONATE: 0.5 CREAM TOPICAL at 00:00

## 2021-08-04 ASSESSMENT — LOCATION SIMPLE DESCRIPTION DERM: LOCATION SIMPLE: RIGHT UPPER BACK

## 2021-08-04 ASSESSMENT — LOCATION ZONE DERM: LOCATION ZONE: TRUNK

## 2021-08-04 ASSESSMENT — LOCATION DETAILED DESCRIPTION DERM: LOCATION DETAILED: RIGHT SUPERIOR MEDIAL UPPER BACK

## 2021-08-04 NOTE — PROCEDURE: TREATMENT REGIMEN
Initiate Treatment: clobetasol 0.05 % topical, Apply to rash bid x 2 weeks then prn itching
Detail Level: Simple

## 2021-08-04 NOTE — HPI: ITCHING
What Type Of Note Output Would You Prefer (Optional)?: Standard Output
On A Scale Of 0 To 10 How Would You Rate Your Itching?: 4
How Did Your Itching Occur?: gradual in onset  (over a period of years)
How Severe Is Your Itching?: mild

## 2021-08-18 ENCOUNTER — APPOINTMENT (RX ONLY)
Dept: URBAN - METROPOLITAN AREA CLINIC 87 | Facility: CLINIC | Age: 36
Setting detail: DERMATOLOGY
End: 2021-08-18

## 2021-08-18 VITALS — WEIGHT: 145 LBS | TEMPERATURE: 98.6 F | HEIGHT: 64 IN

## 2021-08-18 DIAGNOSIS — R20.2 PARESTHESIA OF SKIN: ICD-10-CM | Status: RESOLVING

## 2021-08-18 PROCEDURE — ? COUNSELING

## 2021-08-18 PROCEDURE — 99213 OFFICE O/P EST LOW 20 MIN: CPT

## 2021-08-18 PROCEDURE — ? TREATMENT REGIMEN

## 2021-08-18 PROCEDURE — ? PRESCRIPTION

## 2021-08-18 RX ORDER — TRIAMCINOLONE ACETONIDE 1 MG/G
CREAM TOPICAL
Qty: 1 | Refills: 1 | Status: ERX | COMMUNITY
Start: 2021-08-18

## 2021-08-18 RX ORDER — CLOBETASOL PROPIONATE 0.5 MG/G
CREAM TOPICAL
Qty: 1 | Refills: 1 | Status: ERX

## 2021-08-18 RX ADMIN — TRIAMCINOLONE ACETONIDE: 1 CREAM TOPICAL at 00:00

## 2021-08-18 ASSESSMENT — LOCATION SIMPLE DESCRIPTION DERM: LOCATION SIMPLE: RIGHT UPPER BACK

## 2021-08-18 ASSESSMENT — LOCATION ZONE DERM: LOCATION ZONE: TRUNK

## 2021-08-18 ASSESSMENT — LOCATION DETAILED DESCRIPTION DERM: LOCATION DETAILED: RIGHT SUPERIOR MEDIAL UPPER BACK

## 2021-08-18 NOTE — PROCEDURE: TREATMENT REGIMEN
Plan: RTC in 1-2 months for re-evaluation
Initiate Treatment: TAC 0.01% cream twice daily x 1 wk , decrease to once x 1 wk then switch to Amlactin or a heavy moisturizer.
Detail Level: Simple
Continue Regimen: clobetasol 0.05 % topical, Apply to rash bid x 2 weeks then prn itching
Otc Regimen: Heavy moisturizer daily after bath or shower.\\nAmlactin daily

## 2021-09-29 ENCOUNTER — APPOINTMENT (RX ONLY)
Dept: URBAN - METROPOLITAN AREA CLINIC 87 | Facility: CLINIC | Age: 36
Setting detail: DERMATOLOGY
End: 2021-09-29

## 2021-09-29 DIAGNOSIS — L81.0 POSTINFLAMMATORY HYPERPIGMENTATION: ICD-10-CM

## 2021-09-29 DIAGNOSIS — R20.2 PARESTHESIA OF SKIN: ICD-10-CM

## 2021-09-29 PROCEDURE — ? TREATMENT REGIMEN

## 2021-09-29 PROCEDURE — 99213 OFFICE O/P EST LOW 20 MIN: CPT

## 2021-09-29 PROCEDURE — ? COUNSELING

## 2021-09-29 ASSESSMENT — LOCATION DETAILED DESCRIPTION DERM
LOCATION DETAILED: LEFT SUPERIOR UPPER BACK
LOCATION DETAILED: RIGHT SUPERIOR MEDIAL UPPER BACK

## 2021-09-29 ASSESSMENT — LOCATION SIMPLE DESCRIPTION DERM
LOCATION SIMPLE: RIGHT UPPER BACK
LOCATION SIMPLE: LEFT UPPER BACK

## 2021-09-29 ASSESSMENT — LOCATION ZONE DERM: LOCATION ZONE: TRUNK

## 2021-09-29 NOTE — PROCEDURE: TREATMENT REGIMEN
Otc Regimen: Heavy moisturizer daily after bath or shower.\\nAmlactin daily
Plan: RTC in 1-2 months for re-evaluation
Detail Level: Simple
Continue Regimen: clobetasol 0.05 % topical, Apply to rash bid x 2 weeks then prn itching + TAC 0.01% cream twice daily x 1 wk , decrease to once x 1 wk then switch to Amlactin or a heavy moisturizer

## 2021-10-04 ENCOUNTER — PATIENT MESSAGE (OUTPATIENT)
Dept: ADMINISTRATIVE | Facility: HOSPITAL | Age: 36
End: 2021-10-04

## 2021-11-17 ENCOUNTER — APPOINTMENT (RX ONLY)
Dept: URBAN - METROPOLITAN AREA CLINIC 87 | Facility: CLINIC | Age: 36
Setting detail: DERMATOLOGY
End: 2021-11-17

## 2021-11-17 DIAGNOSIS — R20.2 PARESTHESIA OF SKIN: ICD-10-CM

## 2021-11-17 PROCEDURE — ? COUNSELING

## 2021-11-17 PROCEDURE — 99213 OFFICE O/P EST LOW 20 MIN: CPT

## 2021-11-17 PROCEDURE — ? TREATMENT REGIMEN

## 2021-11-17 ASSESSMENT — LOCATION ZONE DERM: LOCATION ZONE: TRUNK

## 2021-11-17 ASSESSMENT — LOCATION DETAILED DESCRIPTION DERM: LOCATION DETAILED: RIGHT SUPERIOR MEDIAL UPPER BACK

## 2021-11-17 ASSESSMENT — LOCATION SIMPLE DESCRIPTION DERM: LOCATION SIMPLE: RIGHT UPPER BACK

## 2021-11-17 NOTE — PROCEDURE: TREATMENT REGIMEN
Detail Level: Simple
Plan: Rtc in 1-2 months for re-evaluation
Continue Regimen: TAC 0.1% cream twice daily x 1 wk , decrease to once x 1 wk then switch to Amlactin or a heavy moisturizer

## 2022-01-12 ENCOUNTER — APPOINTMENT (RX ONLY)
Dept: URBAN - METROPOLITAN AREA CLINIC 87 | Facility: CLINIC | Age: 37
Setting detail: DERMATOLOGY
End: 2022-01-12

## 2022-01-12 DIAGNOSIS — R20.2 PARESTHESIA OF SKIN: ICD-10-CM | Status: IMPROVED

## 2022-01-12 PROCEDURE — ? TREATMENT REGIMEN

## 2022-01-12 PROCEDURE — ? COUNSELING

## 2022-01-12 PROCEDURE — 99213 OFFICE O/P EST LOW 20 MIN: CPT

## 2022-01-12 ASSESSMENT — LOCATION SIMPLE DESCRIPTION DERM: LOCATION SIMPLE: RIGHT UPPER BACK

## 2022-01-12 ASSESSMENT — LOCATION ZONE DERM: LOCATION ZONE: TRUNK

## 2022-01-12 ASSESSMENT — LOCATION DETAILED DESCRIPTION DERM: LOCATION DETAILED: RIGHT SUPERIOR MEDIAL UPPER BACK

## 2022-01-12 NOTE — PROCEDURE: TREATMENT REGIMEN
Detail Level: Simple
Plan: Rtc in 1-2 months for re-evaluation
Continue Regimen: TAC 0.1% cream twice daily when flares occur

## 2022-02-09 ENCOUNTER — APPOINTMENT (RX ONLY)
Dept: URBAN - METROPOLITAN AREA CLINIC 87 | Facility: CLINIC | Age: 37
Setting detail: DERMATOLOGY
End: 2022-02-09

## 2022-02-09 DIAGNOSIS — R20.2 PARESTHESIA OF SKIN: ICD-10-CM

## 2022-02-09 PROCEDURE — ? COUNSELING

## 2022-02-09 PROCEDURE — ? TREATMENT REGIMEN

## 2022-02-09 PROCEDURE — 99213 OFFICE O/P EST LOW 20 MIN: CPT

## 2022-02-09 PROCEDURE — ? PHOTO-DOCUMENTATION

## 2022-02-09 ASSESSMENT — LOCATION ZONE DERM: LOCATION ZONE: TRUNK

## 2022-02-09 ASSESSMENT — LOCATION SIMPLE DESCRIPTION DERM: LOCATION SIMPLE: RIGHT UPPER BACK

## 2022-02-09 ASSESSMENT — LOCATION DETAILED DESCRIPTION DERM: LOCATION DETAILED: RIGHT SUPERIOR MEDIAL UPPER BACK

## 2022-02-09 NOTE — PROCEDURE: TREATMENT REGIMEN
Detail Level: Simple
Continue Regimen: TAC 0.1% cream twice daily when flares occur
Otc Regimen: Amlactin apply to dry areas on trunk up to 3 times a day
Plan: Rtc in 1-2 months for re-evaluation

## 2022-02-09 NOTE — PROCEDURE: PHOTO-DOCUMENTATION
Details (Free Text): Please refer to photo, and take additional photos at f/u
Photo Preface (Leave Blank If You Do Not Want): Photographs were obtained today
Detail Level: Zone

## 2022-04-20 ENCOUNTER — APPOINTMENT (RX ONLY)
Dept: URBAN - METROPOLITAN AREA CLINIC 87 | Facility: CLINIC | Age: 37
Setting detail: DERMATOLOGY
End: 2022-04-20

## 2022-04-20 DIAGNOSIS — R20.2 PARESTHESIA OF SKIN: ICD-10-CM

## 2022-04-20 PROCEDURE — ? TREATMENT REGIMEN

## 2022-04-20 PROCEDURE — ? COUNSELING

## 2022-04-20 PROCEDURE — ? PRESCRIPTION

## 2022-04-20 PROCEDURE — 99213 OFFICE O/P EST LOW 20 MIN: CPT

## 2022-04-20 RX ORDER — CLOBETASOL PROPIONATE 0.5 MG/G
CREAM TOPICAL
Qty: 30 | Refills: 1 | Status: ERX

## 2022-04-20 ASSESSMENT — LOCATION ZONE DERM: LOCATION ZONE: TRUNK

## 2022-04-20 ASSESSMENT — LOCATION SIMPLE DESCRIPTION DERM: LOCATION SIMPLE: RIGHT UPPER BACK

## 2022-04-20 ASSESSMENT — LOCATION DETAILED DESCRIPTION DERM: LOCATION DETAILED: RIGHT SUPERIOR MEDIAL UPPER BACK

## 2022-04-20 NOTE — PROCEDURE: TREATMENT REGIMEN
Plan: Rtc in 1-2 months for re-evaluation
Otc Regimen: Amlactin apply to dry areas on trunk up to 3 times a day
Continue Regimen: TAC 0.1% cream twice daily when flares occur once inflammation calms down.
Initiate Treatment: Clobetasol cream bid x 1-2 weeks then only use for severe itching.
Detail Level: Simple

## 2022-05-18 ENCOUNTER — APPOINTMENT (RX ONLY)
Dept: URBAN - METROPOLITAN AREA CLINIC 87 | Facility: CLINIC | Age: 37
Setting detail: DERMATOLOGY
End: 2022-05-18

## 2022-05-18 DIAGNOSIS — R20.2 PARESTHESIA OF SKIN: ICD-10-CM

## 2022-05-18 PROCEDURE — ? COUNSELING

## 2022-05-18 PROCEDURE — ? TREATMENT REGIMEN

## 2022-05-18 PROCEDURE — 99213 OFFICE O/P EST LOW 20 MIN: CPT

## 2022-05-18 ASSESSMENT — LOCATION SIMPLE DESCRIPTION DERM: LOCATION SIMPLE: RIGHT UPPER BACK

## 2022-05-18 ASSESSMENT — LOCATION ZONE DERM: LOCATION ZONE: TRUNK

## 2022-05-18 ASSESSMENT — LOCATION DETAILED DESCRIPTION DERM: LOCATION DETAILED: RIGHT SUPERIOR MEDIAL UPPER BACK

## 2022-05-18 NOTE — PROCEDURE: TREATMENT REGIMEN
Plan: Rtc in 1-2 months for re-evaluation
Otc Regimen: Amlactin apply to dry areas on trunk up to 3 times a day
Continue Regimen: Clobetasol cream bid x 1-2 weeks then only use for severe itching.\\nTAC 0.1% cream twice daily when flares occur once inflammation calms down.
Detail Level: Simple

## 2022-06-22 ENCOUNTER — APPOINTMENT (RX ONLY)
Dept: URBAN - METROPOLITAN AREA CLINIC 87 | Facility: CLINIC | Age: 37
Setting detail: DERMATOLOGY
End: 2022-06-22

## 2022-06-22 VITALS — TEMPERATURE: 97.8 F | HEIGHT: 64 IN | WEIGHT: 140 LBS

## 2022-06-22 DIAGNOSIS — R20.2 PARESTHESIA OF SKIN: ICD-10-CM | Status: IMPROVED

## 2022-06-22 DIAGNOSIS — L81.0 POSTINFLAMMATORY HYPERPIGMENTATION: ICD-10-CM

## 2022-06-22 PROCEDURE — ? TREATMENT REGIMEN

## 2022-06-22 PROCEDURE — ? COUNSELING

## 2022-06-22 PROCEDURE — 99213 OFFICE O/P EST LOW 20 MIN: CPT

## 2022-06-22 PROCEDURE — ? PRESCRIPTION

## 2022-06-22 RX ORDER — TRIAMCINOLONE ACETONIDE 1 MG/G
CREAM TOPICAL BID
Qty: 454 | Refills: 1 | Status: ERX | COMMUNITY
Start: 2022-06-22

## 2022-06-22 RX ADMIN — TRIAMCINOLONE ACETONIDE: 1 CREAM TOPICAL at 00:00

## 2022-06-22 ASSESSMENT — LOCATION SIMPLE DESCRIPTION DERM
LOCATION SIMPLE: UPPER BACK
LOCATION SIMPLE: RIGHT UPPER BACK

## 2022-06-22 ASSESSMENT — LOCATION DETAILED DESCRIPTION DERM
LOCATION DETAILED: RIGHT SUPERIOR MEDIAL UPPER BACK
LOCATION DETAILED: SUPERIOR THORACIC SPINE

## 2022-06-22 ASSESSMENT — LOCATION ZONE DERM: LOCATION ZONE: TRUNK

## 2022-06-22 NOTE — PROCEDURE: TREATMENT REGIMEN
Plan: Rtc in 6 months for re-evaluation
Otc Regimen: Amlactin apply to dry areas on trunk up to 3 times a day
Continue Regimen: Clobetasol cream bid x 1-2 weeks then only use for severe itching.\\nTriamcinolone acetonide 0.1 % topical cream BID twice daily when flares occur once inflammation calms down.
Detail Level: Simple
Detail Level: Zone
Otc Regimen: DD lighting cream (previously purchased)

## 2022-11-21 NOTE — TELEPHONE ENCOUNTER
Received FMLA paperwork from [employer]. Paperwork was completed and faxed back to [employer] at [phone].    [facility]     No

## 2023-01-11 ENCOUNTER — APPOINTMENT (RX ONLY)
Dept: URBAN - METROPOLITAN AREA CLINIC 87 | Facility: CLINIC | Age: 38
Setting detail: DERMATOLOGY
End: 2023-01-11

## 2023-01-11 VITALS — WEIGHT: 140 LBS | HEIGHT: 64 IN

## 2023-01-11 VITALS — HEIGHT: 64 IN | WEIGHT: 140 LBS

## 2023-01-11 DIAGNOSIS — R20.2 PARESTHESIA OF SKIN: ICD-10-CM

## 2023-01-11 DIAGNOSIS — D485 NEOPLASM OF UNCERTAIN BEHAVIOR OF SKIN: ICD-10-CM

## 2023-01-11 PROBLEM — D48.5 NEOPLASM OF UNCERTAIN BEHAVIOR OF SKIN: Status: ACTIVE | Noted: 2023-01-11

## 2023-01-11 PROCEDURE — 99213 OFFICE O/P EST LOW 20 MIN: CPT | Mod: 25

## 2023-01-11 PROCEDURE — ? BIOPSY BY PUNCH METHOD

## 2023-01-11 PROCEDURE — ? COUNSELING

## 2023-01-11 PROCEDURE — ? TREATMENT REGIMEN

## 2023-01-11 PROCEDURE — 11104 PUNCH BX SKIN SINGLE LESION: CPT

## 2023-01-11 ASSESSMENT — LOCATION DETAILED DESCRIPTION DERM
LOCATION DETAILED: LEFT SUPERIOR UPPER BACK
LOCATION DETAILED: RIGHT SUPERIOR MEDIAL UPPER BACK
LOCATION DETAILED: SUPERIOR THORACIC SPINE

## 2023-01-11 ASSESSMENT — LOCATION ZONE DERM: LOCATION ZONE: TRUNK

## 2023-01-11 ASSESSMENT — LOCATION SIMPLE DESCRIPTION DERM
LOCATION SIMPLE: UPPER BACK
LOCATION SIMPLE: LEFT UPPER BACK
LOCATION SIMPLE: RIGHT UPPER BACK

## 2023-01-11 NOTE — PROCEDURE: BIOPSY BY PUNCH METHOD

## 2023-01-11 NOTE — PROCEDURE: TREATMENT REGIMEN
Otc Regimen: Amlactin apply to dry areas on trunk up to 3 times a day
Continue Regimen: Clobetasol cream bid x 1-2 weeks then only use for severe itching.\\nTriamcinolone acetonide 0.1 % topical cream BID twice daily when flares occur once inflammation calms down.
Detail Level: Simple

## 2023-01-25 ENCOUNTER — APPOINTMENT (RX ONLY)
Dept: URBAN - METROPOLITAN AREA CLINIC 87 | Facility: CLINIC | Age: 38
Setting detail: DERMATOLOGY
End: 2023-01-25

## 2023-01-25 DIAGNOSIS — L81.8 OTHER SPECIFIED DISORDERS OF PIGMENTATION: ICD-10-CM

## 2023-01-25 DIAGNOSIS — L81.4 OTHER MELANIN HYPERPIGMENTATION: ICD-10-CM

## 2023-01-25 DIAGNOSIS — L70.0 ACNE VULGARIS: ICD-10-CM

## 2023-01-25 PROCEDURE — ? PRESCRIPTION

## 2023-01-25 PROCEDURE — ? PATHOLOGY DISCUSSION

## 2023-01-25 PROCEDURE — 99214 OFFICE O/P EST MOD 30 MIN: CPT

## 2023-01-25 PROCEDURE — ? COUNSELING

## 2023-01-25 PROCEDURE — ? TREATMENT REGIMEN

## 2023-01-25 PROCEDURE — ? ADDITIONAL NOTES

## 2023-01-25 RX ORDER — TRIFAROTENE 50 UG/G
CREAM TOPICAL
Qty: 45 | Refills: 2 | Status: ERX | COMMUNITY
Start: 2023-01-25

## 2023-01-25 RX ORDER — SARECYCLINE HYDROCHLORIDE 150 MG/1
TABLET, COATED ORAL
Qty: 30 | Refills: 0 | Status: ERX | COMMUNITY
Start: 2023-01-25

## 2023-01-25 RX ADMIN — SARECYCLINE HYDROCHLORIDE: 150 TABLET, COATED ORAL at 00:00

## 2023-01-25 RX ADMIN — TRIFAROTENE: 50 CREAM TOPICAL at 00:00

## 2023-01-25 ASSESSMENT — LOCATION ZONE DERM
LOCATION ZONE: NECK
LOCATION ZONE: TRUNK

## 2023-01-25 ASSESSMENT — LOCATION DETAILED DESCRIPTION DERM
LOCATION DETAILED: LEFT MID-UPPER BACK
LOCATION DETAILED: RIGHT MID-UPPER BACK
LOCATION DETAILED: RIGHT MEDIAL TRAPEZIAL NECK
LOCATION DETAILED: RIGHT SUPERIOR UPPER BACK
LOCATION DETAILED: LEFT SUPERIOR UPPER BACK

## 2023-01-25 ASSESSMENT — LOCATION SIMPLE DESCRIPTION DERM
LOCATION SIMPLE: LEFT UPPER BACK
LOCATION SIMPLE: RIGHT UPPER BACK
LOCATION SIMPLE: POSTERIOR NECK

## 2023-01-25 NOTE — PROCEDURE: ADDITIONAL NOTES
Render Risk Assessment In Note?: no
Detail Level: Simple
Additional Notes: Reassured patient condition is benign in nature and will treat as hyperpigmentation. Patient advised to avoid the sun and begin use of topical retinoid.

## 2023-01-25 NOTE — PROCEDURE: TREATMENT REGIMEN
Initiate Treatment: Aklief: apply to entire back once daily at night if tolerated\\nSeysara 150mg: take one tablet once daily
Detail Level: Zone

## 2023-01-25 NOTE — PROCEDURE: COUNSELING
Detail Level: Detailed
Tazorac Counseling:  Patient advised that medication is irritating and drying.  Patient may need to apply sparingly and wash off after an hour before eventually leaving it on overnight.  The patient verbalized understanding of the proper use and possible adverse effects of tazorac.  All of the patient's questions and concerns were addressed.
Winlevi Pregnancy And Lactation Text: This medication is considered safe during pregnancy and breastfeeding.
Birth Control Pills Pregnancy And Lactation Text: This medication should be avoided if pregnant and for the first 30 days post-partum.
Topical Sulfur Applications Pregnancy And Lactation Text: This medication is Pregnancy Category C and has an unknown safety profile during pregnancy. It is unknown if this topical medication is excreted in breast milk.
Tetracycline Pregnancy And Lactation Text: This medication is Pregnancy Category D and not consider safe during pregnancy. It is also excreted in breast milk.
Topical Retinoid counseling:  Patient advised to apply a pea-sized amount only at bedtime and wait 30 minutes after washing their face before applying.  If too drying, patient may add a non-comedogenic moisturizer. The patient verbalized understanding of the proper use and possible adverse effects of retinoids.  All of the patient's questions and concerns were addressed.
Aklief Pregnancy And Lactation Text: It is unknown if this medication is safe to use during pregnancy.  It is unknown if this medication is excreted in breast milk.  Breastfeeding women should use the topical cream on the smallest area of the skin for the shortest time needed while breastfeeding.  Do not apply to nipple and areola.
High Dose Vitamin A Counseling: Side effects reviewed, pt to contact office should one occur.
Dapsone Pregnancy And Lactation Text: This medication is Pregnancy Category C and is not considered safe during pregnancy or breast feeding.
Benzoyl Peroxide Counseling: Patient counseled that medicine may cause skin irritation and bleach clothing.  In the event of skin irritation, the patient was advised to reduce the amount of the drug applied or use it less frequently.   The patient verbalized understanding of the proper use and possible adverse effects of benzoyl peroxide.  All of the patient's questions and concerns were addressed.
Topical Clindamycin Pregnancy And Lactation Text: This medication is Pregnancy Category B and is considered safe during pregnancy. It is unknown if it is excreted in breast milk.
Include Pregnancy/Lactation Warning?: No
Azelaic Acid Counseling: Patient counseled that medicine may cause skin irritation and to avoid applying near the eyes.  In the event of skin irritation, the patient was advised to reduce the amount of the drug applied or use it less frequently.   The patient verbalized understanding of the proper use and possible adverse effects of azelaic acid.  All of the patient's questions and concerns were addressed.
Isotretinoin Counseling: Patient should get monthly blood tests, not donate blood, not drive at night if vision affected, not share medication, and not undergo elective surgery for 6 months after tx completed. Side effects reviewed, pt to contact office should one occur.
Spironolactone Pregnancy And Lactation Text: This medication can cause feminization of the male fetus and should be avoided during pregnancy. The active metabolite is also found in breast milk.
Erythromycin Counseling:  I discussed with the patient the risks of erythromycin including but not limited to GI upset, allergic reaction, drug rash, diarrhea, increase in liver enzymes, and yeast infections.
Bactrim Pregnancy And Lactation Text: This medication is Pregnancy Category D and is known to cause fetal risk.  It is also excreted in breast milk.
Aklief counseling:  Patient advised to apply a pea-sized amount only at bedtime and wait 30 minutes after washing their face before applying.  If too drying, patient may add a non-comedogenic moisturizer.  The most commonly reported side effects including irritation, redness, scaling, dryness, stinging, burning, itching, and increased risk of sunburn.  The patient verbalized understanding of the proper use and possible adverse effects of retinoids.  All of the patient's questions and concerns were addressed.
Topical Retinoid Pregnancy And Lactation Text: This medication is Pregnancy Category C. It is unknown if this medication is excreted in breast milk.
Isotretinoin Pregnancy And Lactation Text: This medication is Pregnancy Category X and is considered extremely dangerous during pregnancy. It is unknown if it is excreted in breast milk.
Winlevi Counseling:  I discussed with the patient the risks of topical clascoterone including but not limited to erythema, scaling, itching, and stinging. Patient voiced their understanding.
Erythromycin Pregnancy And Lactation Text: This medication is Pregnancy Category B and is considered safe during pregnancy. It is also excreted in breast milk.
Doxycycline Counseling:  Patient counseled regarding possible photosensitivity and increased risk for sunburn.  Patient instructed to avoid sunlight, if possible.  When exposed to sunlight, patients should wear protective clothing, sunglasses, and sunscreen.  The patient was instructed to call the office immediately if the following severe adverse effects occur:  hearing changes, easy bruising/bleeding, severe headache, or vision changes.  The patient verbalized understanding of the proper use and possible adverse effects of doxycycline.  All of the patient's questions and concerns were addressed.
Azithromycin Pregnancy And Lactation Text: This medication is considered safe during pregnancy and is also secreted in breast milk.
High Dose Vitamin A Pregnancy And Lactation Text: High dose vitamin A therapy is contraindicated during pregnancy and breast feeding.
Tazorac Pregnancy And Lactation Text: This medication is not safe during pregnancy. It is unknown if this medication is excreted in breast milk.
Dapsone Counseling: I discussed with the patient the risks of dapsone including but not limited to hemolytic anemia, agranulocytosis, rashes, methemoglobinemia, kidney failure, peripheral neuropathy, headaches, GI upset, and liver toxicity.  Patients who start dapsone require monitoring including baseline LFTs and weekly CBCs for the first month, then every month thereafter.  The patient verbalized understanding of the proper use and possible adverse effects of dapsone.  All of the patient's questions and concerns were addressed.
Spironolactone Counseling: Patient advised regarding risks of diarrhea, abdominal pain, hyperkalemia, birth defects (for female patients), liver toxicity and renal toxicity. The patient may need blood work to monitor liver and kidney function and potassium levels while on therapy. The patient verbalized understanding of the proper use and possible adverse effects of spironolactone.  All of the patient's questions and concerns were addressed.
Minocycline Counseling: Patient advised regarding possible photosensitivity and discoloration of the teeth, skin, lips, tongue and gums.  Patient instructed to avoid sunlight, if possible.  When exposed to sunlight, patients should wear protective clothing, sunglasses, and sunscreen.  The patient was instructed to call the office immediately if the following severe adverse effects occur:  hearing changes, easy bruising/bleeding, severe headache, or vision changes.  The patient verbalized understanding of the proper use and possible adverse effects of minocycline.  All of the patient's questions and concerns were addressed.
Doxycycline Pregnancy And Lactation Text: This medication is Pregnancy Category D and not consider safe during pregnancy. It is also excreted in breast milk but is considered safe for shorter treatment courses.
Bactrim Counseling:  I discussed with the patient the risks of sulfa antibiotics including but not limited to GI upset, allergic reaction, drug rash, diarrhea, dizziness, photosensitivity, and yeast infections.  Rarely, more serious reactions can occur including but not limited to aplastic anemia, agranulocytosis, methemoglobinemia, blood dyscrasias, liver or kidney failure, lung infiltrates or desquamative/blistering drug rashes.
Topical Clindamycin Counseling: Patient counseled that this medication may cause skin irritation or allergic reactions.  In the event of skin irritation, the patient was advised to reduce the amount of the drug applied or use it less frequently.   The patient verbalized understanding of the proper use and possible adverse effects of clindamycin.  All of the patient's questions and concerns were addressed.
Detail Level: Zone
Azelaic Acid Pregnancy And Lactation Text: This medication is considered safe during pregnancy and breast feeding.
Azithromycin Counseling:  I discussed with the patient the risks of azithromycin including but not limited to GI upset, allergic reaction, drug rash, diarrhea, and yeast infections.
Birth Control Pills Counseling: Birth Control Pill Counseling: I discussed with the patient the potential side effects of OCPs including but not limited to increased risk of stroke, heart attack, thrombophlebitis, deep venous thrombosis, hepatic adenomas, breast changes, GI upset, headaches, and depression.  The patient verbalized understanding of the proper use and possible adverse effects of OCPs. All of the patient's questions and concerns were addressed.
Tetracycline Counseling: Patient counseled regarding possible photosensitivity and increased risk for sunburn.  Patient instructed to avoid sunlight, if possible.  When exposed to sunlight, patients should wear protective clothing, sunglasses, and sunscreen.  The patient was instructed to call the office immediately if the following severe adverse effects occur:  hearing changes, easy bruising/bleeding, severe headache, or vision changes.  The patient verbalized understanding of the proper use and possible adverse effects of tetracycline.  All of the patient's questions and concerns were addressed. Patient understands to avoid pregnancy while on therapy due to potential birth defects.
Benzoyl Peroxide Pregnancy And Lactation Text: This medication is Pregnancy Category C. It is unknown if benzoyl peroxide is excreted in breast milk.
Topical Sulfur Applications Counseling: Topical Sulfur Counseling: Patient counseled that this medication may cause skin irritation or allergic reactions.  In the event of skin irritation, the patient was advised to reduce the amount of the drug applied or use it less frequently.   The patient verbalized understanding of the proper use and possible adverse effects of topical sulfur application.  All of the patient's questions and concerns were addressed.
Sarecycline Counseling: Patient advised regarding possible photosensitivity and discoloration of the teeth, skin, lips, tongue and gums.  Patient instructed to avoid sunlight, if possible.  When exposed to sunlight, patients should wear protective clothing, sunglasses, and sunscreen.  The patient was instructed to call the office immediately if the following severe adverse effects occur:  hearing changes, easy bruising/bleeding, severe headache, or vision changes.  The patient verbalized understanding of the proper use and possible adverse effects of sarecycline.  All of the patient's questions and concerns were addressed.

## 2023-03-01 ENCOUNTER — APPOINTMENT (RX ONLY)
Dept: URBAN - METROPOLITAN AREA CLINIC 86 | Facility: CLINIC | Age: 38
Setting detail: DERMATOLOGY
End: 2023-03-01

## 2023-03-01 DIAGNOSIS — L70.0 ACNE VULGARIS: ICD-10-CM

## 2023-03-01 PROCEDURE — ? COUNSELING

## 2023-03-01 PROCEDURE — ? PRESCRIPTION

## 2023-03-01 PROCEDURE — 99213 OFFICE O/P EST LOW 20 MIN: CPT

## 2023-03-01 PROCEDURE — ? TREATMENT REGIMEN

## 2023-03-01 RX ORDER — TAZAROTENE 0.05 MG/G
CREAM CUTANEOUS QHS
Qty: 60 | Refills: 3 | Status: ERX | COMMUNITY
Start: 2023-03-01

## 2023-03-01 RX ORDER — SARECYCLINE HYDROCHLORIDE 150 MG/1
TABLET, COATED ORAL
Qty: 30 | Refills: 2 | Status: ERX

## 2023-03-01 RX ADMIN — TAZAROTENE: 0.05 CREAM CUTANEOUS at 00:00

## 2023-03-01 ASSESSMENT — LOCATION SIMPLE DESCRIPTION DERM
LOCATION SIMPLE: RIGHT UPPER BACK
LOCATION SIMPLE: LEFT UPPER BACK
LOCATION SIMPLE: POSTERIOR NECK

## 2023-03-01 ASSESSMENT — LOCATION DETAILED DESCRIPTION DERM
LOCATION DETAILED: RIGHT MID-UPPER BACK
LOCATION DETAILED: RIGHT MEDIAL TRAPEZIAL NECK
LOCATION DETAILED: LEFT MID-UPPER BACK

## 2023-03-01 ASSESSMENT — LOCATION ZONE DERM
LOCATION ZONE: NECK
LOCATION ZONE: TRUNK

## 2023-03-01 NOTE — PROCEDURE: TREATMENT REGIMEN
Initiate Treatment: Tazorac 0.05 % topical cream QHS: Apply a pea sized amount at bedtime alternating with Aklief, gradually increasing to every night as tolerated \\nAklief: apply to entire back at  night on the nights not using tazorac\\nSeysara 150mg: take one tablet once daily
Detail Level: Zone

## 2023-03-01 NOTE — PROCEDURE: COUNSELING
Tazorac Counseling:  Patient advised that medication is irritating and drying.  Patient may need to apply sparingly and wash off after an hour before eventually leaving it on overnight.  The patient verbalized understanding of the proper use and possible adverse effects of tazorac.  All of the patient's questions and concerns were addressed.
Winlevi Pregnancy And Lactation Text: This medication is considered safe during pregnancy and breastfeeding.
Birth Control Pills Pregnancy And Lactation Text: This medication should be avoided if pregnant and for the first 30 days post-partum.
Topical Sulfur Applications Pregnancy And Lactation Text: This medication is Pregnancy Category C and has an unknown safety profile during pregnancy. It is unknown if this topical medication is excreted in breast milk.
Tetracycline Pregnancy And Lactation Text: This medication is Pregnancy Category D and not consider safe during pregnancy. It is also excreted in breast milk.
Topical Retinoid counseling:  Patient advised to apply a pea-sized amount only at bedtime and wait 30 minutes after washing their face before applying.  If too drying, patient may add a non-comedogenic moisturizer. The patient verbalized understanding of the proper use and possible adverse effects of retinoids.  All of the patient's questions and concerns were addressed.
Aklief Pregnancy And Lactation Text: It is unknown if this medication is safe to use during pregnancy.  It is unknown if this medication is excreted in breast milk.  Breastfeeding women should use the topical cream on the smallest area of the skin for the shortest time needed while breastfeeding.  Do not apply to nipple and areola.
High Dose Vitamin A Counseling: Side effects reviewed, pt to contact office should one occur.
Dapsone Pregnancy And Lactation Text: This medication is Pregnancy Category C and is not considered safe during pregnancy or breast feeding.
Benzoyl Peroxide Counseling: Patient counseled that medicine may cause skin irritation and bleach clothing.  In the event of skin irritation, the patient was advised to reduce the amount of the drug applied or use it less frequently.   The patient verbalized understanding of the proper use and possible adverse effects of benzoyl peroxide.  All of the patient's questions and concerns were addressed.
Topical Clindamycin Pregnancy And Lactation Text: This medication is Pregnancy Category B and is considered safe during pregnancy. It is unknown if it is excreted in breast milk.
Include Pregnancy/Lactation Warning?: No
Azelaic Acid Counseling: Patient counseled that medicine may cause skin irritation and to avoid applying near the eyes.  In the event of skin irritation, the patient was advised to reduce the amount of the drug applied or use it less frequently.   The patient verbalized understanding of the proper use and possible adverse effects of azelaic acid.  All of the patient's questions and concerns were addressed.
Isotretinoin Counseling: Patient should get monthly blood tests, not donate blood, not drive at night if vision affected, not share medication, and not undergo elective surgery for 6 months after tx completed. Side effects reviewed, pt to contact office should one occur.
Spironolactone Pregnancy And Lactation Text: This medication can cause feminization of the male fetus and should be avoided during pregnancy. The active metabolite is also found in breast milk.
Erythromycin Counseling:  I discussed with the patient the risks of erythromycin including but not limited to GI upset, allergic reaction, drug rash, diarrhea, increase in liver enzymes, and yeast infections.
Bactrim Pregnancy And Lactation Text: This medication is Pregnancy Category D and is known to cause fetal risk.  It is also excreted in breast milk.
Aklief counseling:  Patient advised to apply a pea-sized amount only at bedtime and wait 30 minutes after washing their face before applying.  If too drying, patient may add a non-comedogenic moisturizer.  The most commonly reported side effects including irritation, redness, scaling, dryness, stinging, burning, itching, and increased risk of sunburn.  The patient verbalized understanding of the proper use and possible adverse effects of retinoids.  All of the patient's questions and concerns were addressed.
Topical Retinoid Pregnancy And Lactation Text: This medication is Pregnancy Category C. It is unknown if this medication is excreted in breast milk.
Isotretinoin Pregnancy And Lactation Text: This medication is Pregnancy Category X and is considered extremely dangerous during pregnancy. It is unknown if it is excreted in breast milk.
Winlevi Counseling:  I discussed with the patient the risks of topical clascoterone including but not limited to erythema, scaling, itching, and stinging. Patient voiced their understanding.
Erythromycin Pregnancy And Lactation Text: This medication is Pregnancy Category B and is considered safe during pregnancy. It is also excreted in breast milk.
Doxycycline Counseling:  Patient counseled regarding possible photosensitivity and increased risk for sunburn.  Patient instructed to avoid sunlight, if possible.  When exposed to sunlight, patients should wear protective clothing, sunglasses, and sunscreen.  The patient was instructed to call the office immediately if the following severe adverse effects occur:  hearing changes, easy bruising/bleeding, severe headache, or vision changes.  The patient verbalized understanding of the proper use and possible adverse effects of doxycycline.  All of the patient's questions and concerns were addressed.
Azithromycin Pregnancy And Lactation Text: This medication is considered safe during pregnancy and is also secreted in breast milk.
High Dose Vitamin A Pregnancy And Lactation Text: High dose vitamin A therapy is contraindicated during pregnancy and breast feeding.
Tazorac Pregnancy And Lactation Text: This medication is not safe during pregnancy. It is unknown if this medication is excreted in breast milk.
Dapsone Counseling: I discussed with the patient the risks of dapsone including but not limited to hemolytic anemia, agranulocytosis, rashes, methemoglobinemia, kidney failure, peripheral neuropathy, headaches, GI upset, and liver toxicity.  Patients who start dapsone require monitoring including baseline LFTs and weekly CBCs for the first month, then every month thereafter.  The patient verbalized understanding of the proper use and possible adverse effects of dapsone.  All of the patient's questions and concerns were addressed.
Spironolactone Counseling: Patient advised regarding risks of diarrhea, abdominal pain, hyperkalemia, birth defects (for female patients), liver toxicity and renal toxicity. The patient may need blood work to monitor liver and kidney function and potassium levels while on therapy. The patient verbalized understanding of the proper use and possible adverse effects of spironolactone.  All of the patient's questions and concerns were addressed.
Minocycline Counseling: Patient advised regarding possible photosensitivity and discoloration of the teeth, skin, lips, tongue and gums.  Patient instructed to avoid sunlight, if possible.  When exposed to sunlight, patients should wear protective clothing, sunglasses, and sunscreen.  The patient was instructed to call the office immediately if the following severe adverse effects occur:  hearing changes, easy bruising/bleeding, severe headache, or vision changes.  The patient verbalized understanding of the proper use and possible adverse effects of minocycline.  All of the patient's questions and concerns were addressed.
Doxycycline Pregnancy And Lactation Text: This medication is Pregnancy Category D and not consider safe during pregnancy. It is also excreted in breast milk but is considered safe for shorter treatment courses.
Bactrim Counseling:  I discussed with the patient the risks of sulfa antibiotics including but not limited to GI upset, allergic reaction, drug rash, diarrhea, dizziness, photosensitivity, and yeast infections.  Rarely, more serious reactions can occur including but not limited to aplastic anemia, agranulocytosis, methemoglobinemia, blood dyscrasias, liver or kidney failure, lung infiltrates or desquamative/blistering drug rashes.
Topical Clindamycin Counseling: Patient counseled that this medication may cause skin irritation or allergic reactions.  In the event of skin irritation, the patient was advised to reduce the amount of the drug applied or use it less frequently.   The patient verbalized understanding of the proper use and possible adverse effects of clindamycin.  All of the patient's questions and concerns were addressed.
Detail Level: Zone
Azelaic Acid Pregnancy And Lactation Text: This medication is considered safe during pregnancy and breast feeding.
Azithromycin Counseling:  I discussed with the patient the risks of azithromycin including but not limited to GI upset, allergic reaction, drug rash, diarrhea, and yeast infections.
Birth Control Pills Counseling: Birth Control Pill Counseling: I discussed with the patient the potential side effects of OCPs including but not limited to increased risk of stroke, heart attack, thrombophlebitis, deep venous thrombosis, hepatic adenomas, breast changes, GI upset, headaches, and depression.  The patient verbalized understanding of the proper use and possible adverse effects of OCPs. All of the patient's questions and concerns were addressed.
Tetracycline Counseling: Patient counseled regarding possible photosensitivity and increased risk for sunburn.  Patient instructed to avoid sunlight, if possible.  When exposed to sunlight, patients should wear protective clothing, sunglasses, and sunscreen.  The patient was instructed to call the office immediately if the following severe adverse effects occur:  hearing changes, easy bruising/bleeding, severe headache, or vision changes.  The patient verbalized understanding of the proper use and possible adverse effects of tetracycline.  All of the patient's questions and concerns were addressed. Patient understands to avoid pregnancy while on therapy due to potential birth defects.
Benzoyl Peroxide Pregnancy And Lactation Text: This medication is Pregnancy Category C. It is unknown if benzoyl peroxide is excreted in breast milk.
Topical Sulfur Applications Counseling: Topical Sulfur Counseling: Patient counseled that this medication may cause skin irritation or allergic reactions.  In the event of skin irritation, the patient was advised to reduce the amount of the drug applied or use it less frequently.   The patient verbalized understanding of the proper use and possible adverse effects of topical sulfur application.  All of the patient's questions and concerns were addressed.
Sarecycline Counseling: Patient advised regarding possible photosensitivity and discoloration of the teeth, skin, lips, tongue and gums.  Patient instructed to avoid sunlight, if possible.  When exposed to sunlight, patients should wear protective clothing, sunglasses, and sunscreen.  The patient was instructed to call the office immediately if the following severe adverse effects occur:  hearing changes, easy bruising/bleeding, severe headache, or vision changes.  The patient verbalized understanding of the proper use and possible adverse effects of sarecycline.  All of the patient's questions and concerns were addressed.

## 2023-06-14 ENCOUNTER — APPOINTMENT (RX ONLY)
Dept: URBAN - METROPOLITAN AREA CLINIC 86 | Facility: CLINIC | Age: 38
Setting detail: DERMATOLOGY
End: 2023-06-14

## 2023-06-14 VITALS — WEIGHT: 140 LBS | HEIGHT: 64 IN

## 2023-06-14 DIAGNOSIS — L70.0 ACNE VULGARIS: ICD-10-CM | Status: IMPROVED

## 2023-06-14 PROCEDURE — 99214 OFFICE O/P EST MOD 30 MIN: CPT

## 2023-06-14 PROCEDURE — ? PRESCRIPTION

## 2023-06-14 PROCEDURE — ? TREATMENT REGIMEN

## 2023-06-14 PROCEDURE — ? COUNSELING

## 2023-06-14 RX ORDER — TAZAROTENE 0.1 MG/G
CREAM CUTANEOUS
Qty: 60 | Refills: 2 | Status: ERX | COMMUNITY
Start: 2023-06-14

## 2023-06-14 RX ORDER — SARECYCLINE HYDROCHLORIDE 150 MG/1
TABLET, COATED ORAL
Qty: 30 | Refills: 2 | Status: ERX

## 2023-06-14 RX ADMIN — TAZAROTENE: 0.1 CREAM CUTANEOUS at 00:00

## 2023-06-14 ASSESSMENT — LOCATION SIMPLE DESCRIPTION DERM
LOCATION SIMPLE: POSTERIOR NECK
LOCATION SIMPLE: RIGHT UPPER BACK
LOCATION SIMPLE: LEFT UPPER BACK

## 2023-06-14 ASSESSMENT — LOCATION ZONE DERM
LOCATION ZONE: NECK
LOCATION ZONE: TRUNK

## 2023-06-14 NOTE — PROCEDURE: TREATMENT REGIMEN
Initiate Treatment: Tazorac 0.1 % topical cream QHS
Continue Regimen: Seysara 150mg: take one tablet once daily
Detail Level: Zone
Discontinue Regimen: Aklief (switching to Tazorac 0.1 % topical cream) + Tazorac 0.05 % topical cream (increasing)

## 2023-08-11 NOTE — TELEPHONE ENCOUNTER
I called the patient and discussed the results from her injection.     Pain before injection: 6-7/10  Pain after: 3/10    100% relief the morning after the injection but notes pain gradually returned throughout the day today and note now 60-70% relief overall    She notes that she has no pain with driving, no pain with bending over at the sink, and walking is more comfortable    I told her that Dr. Summers would review her results and we would call her with a plan of care   Unable to Assess

## 2023-09-20 ENCOUNTER — APPOINTMENT (RX ONLY)
Dept: URBAN - METROPOLITAN AREA CLINIC 86 | Facility: CLINIC | Age: 38
Setting detail: DERMATOLOGY
End: 2023-09-20

## 2023-09-20 VITALS — HEIGHT: 62 IN | WEIGHT: 140 LBS

## 2023-09-20 DIAGNOSIS — L70.0 ACNE VULGARIS: ICD-10-CM

## 2023-09-20 PROCEDURE — ? TREATMENT REGIMEN

## 2023-09-20 PROCEDURE — 99214 OFFICE O/P EST MOD 30 MIN: CPT

## 2023-09-20 PROCEDURE — ? COUNSELING

## 2023-09-20 PROCEDURE — ? PRESCRIPTION

## 2023-09-20 RX ORDER — SARECYCLINE HYDROCHLORIDE 150 MG/1
TABLET, COATED ORAL
Qty: 30 | Refills: 2 | Status: ERX

## 2023-09-20 RX ORDER — TAZAROTENE 0.1 MG/G
CREAM CUTANEOUS
Qty: 60 | Refills: 2 | Status: ERX

## 2023-09-20 ASSESSMENT — LOCATION SIMPLE DESCRIPTION DERM
LOCATION SIMPLE: LEFT UPPER BACK
LOCATION SIMPLE: POSTERIOR NECK
LOCATION SIMPLE: RIGHT UPPER BACK

## 2023-09-20 ASSESSMENT — LOCATION DETAILED DESCRIPTION DERM
LOCATION DETAILED: LEFT MID-UPPER BACK
LOCATION DETAILED: RIGHT MEDIAL TRAPEZIAL NECK
LOCATION DETAILED: RIGHT MID-UPPER BACK

## 2023-09-20 ASSESSMENT — LOCATION ZONE DERM
LOCATION ZONE: TRUNK
LOCATION ZONE: NECK

## 2023-09-20 NOTE — PROCEDURE: TREATMENT REGIMEN
Continue Regimen: Seysara 150mg: take one tablet once daily + Tazorac 0.1 % topical cream QHS
Detail Level: Zone
Discontinue Regimen: Aklief (switching to Tazorac 0.1 % topical cream) + Tazorac 0.05 % topical cream (increasing)

## 2023-12-20 ENCOUNTER — APPOINTMENT (RX ONLY)
Dept: URBAN - METROPOLITAN AREA CLINIC 86 | Facility: CLINIC | Age: 38
Setting detail: DERMATOLOGY
End: 2023-12-20

## 2023-12-20 DIAGNOSIS — L70.0 ACNE VULGARIS: ICD-10-CM

## 2023-12-20 DIAGNOSIS — B36.8 OTHER SPECIFIED SUPERFICIAL MYCOSES: ICD-10-CM

## 2023-12-20 DIAGNOSIS — L40.0 PSORIASIS VULGARIS: ICD-10-CM

## 2023-12-20 PROCEDURE — ? COUNSELING

## 2023-12-20 PROCEDURE — ? TREATMENT REGIMEN

## 2023-12-20 PROCEDURE — ? PRESCRIPTION

## 2023-12-20 PROCEDURE — 99214 OFFICE O/P EST MOD 30 MIN: CPT

## 2023-12-20 RX ORDER — TAZAROTENE 0.1 MG/G
CREAM CUTANEOUS
Qty: 60 | Refills: 2 | Status: ERX

## 2023-12-20 RX ORDER — CLOBETASOL PROPIONATE 0.5 MG/ML
SOLUTION TOPICAL
Qty: 50 | Refills: 2 | Status: ERX | COMMUNITY
Start: 2023-12-20

## 2023-12-20 RX ORDER — KETOCONAZOLE 20 MG/ML
SHAMPOO, SUSPENSION TOPICAL BIW
Qty: 120 | Refills: 2 | Status: ERX | COMMUNITY
Start: 2023-12-20

## 2023-12-20 RX ORDER — SARECYCLINE HYDROCHLORIDE 150 MG/1
TABLET, COATED ORAL
Qty: 30 | Refills: 2 | Status: ERX

## 2023-12-20 RX ADMIN — KETOCONAZOLE: 20 SHAMPOO, SUSPENSION TOPICAL at 00:00

## 2023-12-20 RX ADMIN — CLOBETASOL PROPIONATE: 0.5 SOLUTION TOPICAL at 00:00

## 2023-12-20 ASSESSMENT — LOCATION DETAILED DESCRIPTION DERM
LOCATION DETAILED: RIGHT MEDIAL TRAPEZIAL NECK
LOCATION DETAILED: LEFT MID-UPPER BACK
LOCATION DETAILED: MID POSTERIOR NECK
LOCATION DETAILED: RIGHT MID-UPPER BACK

## 2023-12-20 ASSESSMENT — BSA RASH: BSA RASH: 4

## 2023-12-20 ASSESSMENT — LOCATION SIMPLE DESCRIPTION DERM
LOCATION SIMPLE: LEFT UPPER BACK
LOCATION SIMPLE: POSTERIOR NECK
LOCATION SIMPLE: RIGHT UPPER BACK

## 2023-12-20 ASSESSMENT — LOCATION ZONE DERM
LOCATION ZONE: NECK
LOCATION ZONE: TRUNK

## 2023-12-20 NOTE — PROCEDURE: COUNSELING
Tazorac Counseling:  Patient advised that medication is irritating and drying.  Patient may need to apply sparingly and wash off after an hour before eventually leaving it on overnight.  The patient verbalized understanding of the proper use and possible adverse effects of tazorac.  All of the patient's questions and concerns were addressed.
Winlevi Pregnancy And Lactation Text: This medication is considered safe during pregnancy and breastfeeding.
Birth Control Pills Pregnancy And Lactation Text: This medication should be avoided if pregnant and for the first 30 days post-partum.
Topical Sulfur Applications Pregnancy And Lactation Text: This medication is Pregnancy Category C and has an unknown safety profile during pregnancy. It is unknown if this topical medication is excreted in breast milk.
Tetracycline Pregnancy And Lactation Text: This medication is Pregnancy Category D and not consider safe during pregnancy. It is also excreted in breast milk.
Topical Retinoid counseling:  Patient advised to apply a pea-sized amount only at bedtime and wait 30 minutes after washing their face before applying.  If too drying, patient may add a non-comedogenic moisturizer. The patient verbalized understanding of the proper use and possible adverse effects of retinoids.  All of the patient's questions and concerns were addressed.
Aklief Pregnancy And Lactation Text: It is unknown if this medication is safe to use during pregnancy.  It is unknown if this medication is excreted in breast milk.  Breastfeeding women should use the topical cream on the smallest area of the skin for the shortest time needed while breastfeeding.  Do not apply to nipple and areola.
High Dose Vitamin A Counseling: Side effects reviewed, pt to contact office should one occur.
Dapsone Pregnancy And Lactation Text: This medication is Pregnancy Category C and is not considered safe during pregnancy or breast feeding.
Benzoyl Peroxide Counseling: Patient counseled that medicine may cause skin irritation and bleach clothing.  In the event of skin irritation, the patient was advised to reduce the amount of the drug applied or use it less frequently.   The patient verbalized understanding of the proper use and possible adverse effects of benzoyl peroxide.  All of the patient's questions and concerns were addressed.
Topical Clindamycin Pregnancy And Lactation Text: This medication is Pregnancy Category B and is considered safe during pregnancy. It is unknown if it is excreted in breast milk.
Include Pregnancy/Lactation Warning?: No
Azelaic Acid Counseling: Patient counseled that medicine may cause skin irritation and to avoid applying near the eyes.  In the event of skin irritation, the patient was advised to reduce the amount of the drug applied or use it less frequently.   The patient verbalized understanding of the proper use and possible adverse effects of azelaic acid.  All of the patient's questions and concerns were addressed.
Isotretinoin Counseling: Patient should get monthly blood tests, not donate blood, not drive at night if vision affected, not share medication, and not undergo elective surgery for 6 months after tx completed. Side effects reviewed, pt to contact office should one occur.
Spironolactone Pregnancy And Lactation Text: This medication can cause feminization of the male fetus and should be avoided during pregnancy. The active metabolite is also found in breast milk.
Erythromycin Counseling:  I discussed with the patient the risks of erythromycin including but not limited to GI upset, allergic reaction, drug rash, diarrhea, increase in liver enzymes, and yeast infections.
Bactrim Pregnancy And Lactation Text: This medication is Pregnancy Category D and is known to cause fetal risk.  It is also excreted in breast milk.
Aklief counseling:  Patient advised to apply a pea-sized amount only at bedtime and wait 30 minutes after washing their face before applying.  If too drying, patient may add a non-comedogenic moisturizer.  The most commonly reported side effects including irritation, redness, scaling, dryness, stinging, burning, itching, and increased risk of sunburn.  The patient verbalized understanding of the proper use and possible adverse effects of retinoids.  All of the patient's questions and concerns were addressed.
Topical Retinoid Pregnancy And Lactation Text: This medication is Pregnancy Category C. It is unknown if this medication is excreted in breast milk.
Isotretinoin Pregnancy And Lactation Text: This medication is Pregnancy Category X and is considered extremely dangerous during pregnancy. It is unknown if it is excreted in breast milk.
Winlevi Counseling:  I discussed with the patient the risks of topical clascoterone including but not limited to erythema, scaling, itching, and stinging. Patient voiced their understanding.
Erythromycin Pregnancy And Lactation Text: This medication is Pregnancy Category B and is considered safe during pregnancy. It is also excreted in breast milk.
Doxycycline Counseling:  Patient counseled regarding possible photosensitivity and increased risk for sunburn.  Patient instructed to avoid sunlight, if possible.  When exposed to sunlight, patients should wear protective clothing, sunglasses, and sunscreen.  The patient was instructed to call the office immediately if the following severe adverse effects occur:  hearing changes, easy bruising/bleeding, severe headache, or vision changes.  The patient verbalized understanding of the proper use and possible adverse effects of doxycycline.  All of the patient's questions and concerns were addressed.
Azithromycin Pregnancy And Lactation Text: This medication is considered safe during pregnancy and is also secreted in breast milk.
High Dose Vitamin A Pregnancy And Lactation Text: High dose vitamin A therapy is contraindicated during pregnancy and breast feeding.
Tazorac Pregnancy And Lactation Text: This medication is not safe during pregnancy. It is unknown if this medication is excreted in breast milk.
Dapsone Counseling: I discussed with the patient the risks of dapsone including but not limited to hemolytic anemia, agranulocytosis, rashes, methemoglobinemia, kidney failure, peripheral neuropathy, headaches, GI upset, and liver toxicity.  Patients who start dapsone require monitoring including baseline LFTs and weekly CBCs for the first month, then every month thereafter.  The patient verbalized understanding of the proper use and possible adverse effects of dapsone.  All of the patient's questions and concerns were addressed.
Spironolactone Counseling: Patient advised regarding risks of diarrhea, abdominal pain, hyperkalemia, birth defects (for female patients), liver toxicity and renal toxicity. The patient may need blood work to monitor liver and kidney function and potassium levels while on therapy. The patient verbalized understanding of the proper use and possible adverse effects of spironolactone.  All of the patient's questions and concerns were addressed.
Minocycline Counseling: Patient advised regarding possible photosensitivity and discoloration of the teeth, skin, lips, tongue and gums.  Patient instructed to avoid sunlight, if possible.  When exposed to sunlight, patients should wear protective clothing, sunglasses, and sunscreen.  The patient was instructed to call the office immediately if the following severe adverse effects occur:  hearing changes, easy bruising/bleeding, severe headache, or vision changes.  The patient verbalized understanding of the proper use and possible adverse effects of minocycline.  All of the patient's questions and concerns were addressed.
Doxycycline Pregnancy And Lactation Text: This medication is Pregnancy Category D and not consider safe during pregnancy. It is also excreted in breast milk but is considered safe for shorter treatment courses.
Bactrim Counseling:  I discussed with the patient the risks of sulfa antibiotics including but not limited to GI upset, allergic reaction, drug rash, diarrhea, dizziness, photosensitivity, and yeast infections.  Rarely, more serious reactions can occur including but not limited to aplastic anemia, agranulocytosis, methemoglobinemia, blood dyscrasias, liver or kidney failure, lung infiltrates or desquamative/blistering drug rashes.
Topical Clindamycin Counseling: Patient counseled that this medication may cause skin irritation or allergic reactions.  In the event of skin irritation, the patient was advised to reduce the amount of the drug applied or use it less frequently.   The patient verbalized understanding of the proper use and possible adverse effects of clindamycin.  All of the patient's questions and concerns were addressed.
Detail Level: Zone
Azelaic Acid Pregnancy And Lactation Text: This medication is considered safe during pregnancy and breast feeding.
Azithromycin Counseling:  I discussed with the patient the risks of azithromycin including but not limited to GI upset, allergic reaction, drug rash, diarrhea, and yeast infections.
Birth Control Pills Counseling: Birth Control Pill Counseling: I discussed with the patient the potential side effects of OCPs including but not limited to increased risk of stroke, heart attack, thrombophlebitis, deep venous thrombosis, hepatic adenomas, breast changes, GI upset, headaches, and depression.  The patient verbalized understanding of the proper use and possible adverse effects of OCPs. All of the patient's questions and concerns were addressed.
Tetracycline Counseling: Patient counseled regarding possible photosensitivity and increased risk for sunburn.  Patient instructed to avoid sunlight, if possible.  When exposed to sunlight, patients should wear protective clothing, sunglasses, and sunscreen.  The patient was instructed to call the office immediately if the following severe adverse effects occur:  hearing changes, easy bruising/bleeding, severe headache, or vision changes.  The patient verbalized understanding of the proper use and possible adverse effects of tetracycline.  All of the patient's questions and concerns were addressed. Patient understands to avoid pregnancy while on therapy due to potential birth defects.
Benzoyl Peroxide Pregnancy And Lactation Text: This medication is Pregnancy Category C. It is unknown if benzoyl peroxide is excreted in breast milk.
Topical Sulfur Applications Counseling: Topical Sulfur Counseling: Patient counseled that this medication may cause skin irritation or allergic reactions.  In the event of skin irritation, the patient was advised to reduce the amount of the drug applied or use it less frequently.   The patient verbalized understanding of the proper use and possible adverse effects of topical sulfur application.  All of the patient's questions and concerns were addressed.
Sarecycline Counseling: Patient advised regarding possible photosensitivity and discoloration of the teeth, skin, lips, tongue and gums.  Patient instructed to avoid sunlight, if possible.  When exposed to sunlight, patients should wear protective clothing, sunglasses, and sunscreen.  The patient was instructed to call the office immediately if the following severe adverse effects occur:  hearing changes, easy bruising/bleeding, severe headache, or vision changes.  The patient verbalized understanding of the proper use and possible adverse effects of sarecycline.  All of the patient's questions and concerns were addressed.
Detail Level: Detailed

## 2023-12-20 NOTE — PROCEDURE: TREATMENT REGIMEN
Plan: Patient states she has been applying Triamcinalone to her back daily for itchiness after using Tazorac. Patient was advised to use tazorac with lotion once a week then gradually increase to twice a week as tolerated. Patient will also begin an antihistamine every night for itching.
Continue Regimen: Seysara 150mg: take one tablet once daily + Tazorac 0.1 % topical cream QHS
Detail Level: Zone
Discontinue Regimen: Aklief (switching to Tazorac 0.1 % topical cream) + Tazorac 0.05 % topical cream (increasing)
Otc Regimen: Zyrtec QHS for itchiness
Initiate Treatment: Clobetasol 0.05 % scalp solution (Steroid) - Apply to scalp twice daily for 2 weeks then prn for itching/flares
Plan: Patient reports an itchy patch on the back of her scalp. She states she has been under a lot of stress recently and discussed this may be contributing to her psoriasis.

## 2024-03-13 ENCOUNTER — APPOINTMENT (RX ONLY)
Dept: URBAN - METROPOLITAN AREA CLINIC 86 | Facility: CLINIC | Age: 39
Setting detail: DERMATOLOGY
End: 2024-03-13

## 2024-03-13 DIAGNOSIS — L70.0 ACNE VULGARIS: ICD-10-CM

## 2024-03-13 PROCEDURE — ? COUNSELING

## 2024-03-13 PROCEDURE — ? PRESCRIPTION

## 2024-03-13 PROCEDURE — ? TREATMENT REGIMEN

## 2024-03-13 PROCEDURE — 99214 OFFICE O/P EST MOD 30 MIN: CPT

## 2024-03-13 RX ORDER — CLINDAMYCIN PHOSPHATE 10 MG/1
SWAB TOPICAL
Qty: 60 | Refills: 3 | Status: ERX | COMMUNITY
Start: 2024-03-13

## 2024-03-13 RX ADMIN — CLINDAMYCIN PHOSPHATE: 10 SWAB TOPICAL at 00:00

## 2024-03-13 ASSESSMENT — LOCATION DETAILED DESCRIPTION DERM
LOCATION DETAILED: RIGHT MEDIAL TRAPEZIAL NECK
LOCATION DETAILED: RIGHT MID-UPPER BACK
LOCATION DETAILED: LEFT MID-UPPER BACK

## 2024-03-13 ASSESSMENT — LOCATION SIMPLE DESCRIPTION DERM
LOCATION SIMPLE: RIGHT UPPER BACK
LOCATION SIMPLE: POSTERIOR NECK
LOCATION SIMPLE: LEFT UPPER BACK

## 2024-03-13 ASSESSMENT — LOCATION ZONE DERM
LOCATION ZONE: NECK
LOCATION ZONE: TRUNK

## 2024-03-13 NOTE — PROCEDURE: TREATMENT REGIMEN
Plan: Patient is improving at this time. Will discontinue oral antibiotics and switch to topical antibiotic treatment.
Initiate Treatment: Clindacin ETZ 1 % topical swab: Wipe pad over affected areas on the back once daily
Continue Regimen: Tazorac 0.1 % topical cream QHS
Detail Level: Zone
Discontinue Regimen: Seysara 150mg

## 2024-10-02 ENCOUNTER — APPOINTMENT (RX ONLY)
Dept: URBAN - METROPOLITAN AREA CLINIC 86 | Facility: CLINIC | Age: 39
Setting detail: DERMATOLOGY
End: 2024-10-02

## 2024-10-02 VITALS — HEIGHT: 64 IN | WEIGHT: 140 LBS

## 2024-10-02 DIAGNOSIS — L70.0 ACNE VULGARIS: ICD-10-CM | Status: WELL CONTROLLED

## 2024-10-02 PROCEDURE — ? TREATMENT REGIMEN

## 2024-10-02 PROCEDURE — ? COUNSELING

## 2024-10-02 PROCEDURE — ? PRESCRIPTION

## 2024-10-02 PROCEDURE — 99214 OFFICE O/P EST MOD 30 MIN: CPT

## 2024-10-02 RX ORDER — CLINDAMYCIN PHOSPHATE 10 MG/1
SWAB TOPICAL
Qty: 180 | Refills: 3 | Status: ERX

## 2024-10-02 RX ORDER — TAZAROTENE 0.1 MG/G
CREAM CUTANEOUS
Qty: 60 | Refills: 11 | Status: ERX

## 2024-10-02 ASSESSMENT — LOCATION SIMPLE DESCRIPTION DERM
LOCATION SIMPLE: LEFT UPPER BACK
LOCATION SIMPLE: POSTERIOR NECK
LOCATION SIMPLE: RIGHT UPPER BACK

## 2024-10-02 ASSESSMENT — LOCATION ZONE DERM
LOCATION ZONE: TRUNK
LOCATION ZONE: NECK

## 2024-10-02 NOTE — PROCEDURE: TREATMENT REGIMEN
Samples Given: BPO 10% wash (2)
Plan: Patient is improving at this time and wishes to continue with regimen
Continue Regimen: Tazorac 0.1 % topical cream QHS + Clindacin ETZ 1 % topical swab: Wipe pad over affected areas on the back once daily
Detail Level: Zone
Discontinue Regimen: Seysara 150mg
Otc Regimen: BPO 10% wash

## (undated) DEVICE — SUT ETHILON 3-0 PS2 18 BLK

## (undated) DEVICE — STICK SWITCHING HIP PRESRVTN

## (undated) DEVICE — SEE MEDLINE ITEM 152622

## (undated) DEVICE — TUBE SET INFLOW/OUTFLOW

## (undated) DEVICE — BOOT HIP POSITION SURGICAL

## (undated) DEVICE — DRAPE XRAY EQUIPMENT UNIV

## (undated) DEVICE — GAUZE SPONGE 4X4 12PLY

## (undated) DEVICE — CONTAINER SPECIMEN STRL 4OZ

## (undated) DEVICE — BLADE SHAVER ARTHRO 4.2X19CM

## (undated) DEVICE — PROBE ARTHSCP EDGE ENERGY 50

## (undated) DEVICE — SEE MEDLINE ITEM 152530

## (undated) DEVICE — GOWN B1 X-LG X-LONG

## (undated) DEVICE — SUT BLU BR 2 TAPERD NDL 1/2

## (undated) DEVICE — GOWN SMART IMP BREATHABLE XXLG

## (undated) DEVICE — SOL IRR NACL .9% 3000ML

## (undated) DEVICE — TAPE MEDIPORE 3 X 10YD

## (undated) DEVICE — SHAVER BUR PEAR 6MMX19CM

## (undated) DEVICE — APPLICATOR CHLORAPREP ORN 26ML

## (undated) DEVICE — CANNULA FLOWPORT OBTURATOR

## (undated) DEVICE — WRAPON POLAR PAD HIP FOR POLAR

## (undated) DEVICE — BRACE T-SCOPE HIP SMALL LEFT

## (undated) DEVICE — SOL 9P NACL IRR PIC IL

## (undated) DEVICE — DRESSING XEROFORM FOIL PK 1X8

## (undated) DEVICE — TAPE SURG MEDIPORE 6X72IN

## (undated) DEVICE — GLOVE ORTHO PF SZ 8.5

## (undated) DEVICE — CANNULA TRIM IT CUS HIP

## (undated) DEVICE — DRAPE STERI-DRAPE 1000 17X11IN

## (undated) DEVICE — SLINGSHOT 70DEG UP

## (undated) DEVICE — BLADE BANANA 279MM 11IN DISP

## (undated) DEVICE — SUT FIBERWIRE FIBER 2M

## (undated) DEVICE — Device

## (undated) DEVICE — KIT PORTAL ENTRY

## (undated) DEVICE — PACK HIP ARTHROSCOPY CUSTOM

## (undated) DEVICE — PAD ABD 8X10 STERILE

## (undated) DEVICE — DRESSING LEUKOPLAST FLEX 1X3IN

## (undated) DEVICE — DRAPE STERI INSTRUMENT 1018

## (undated) DEVICE — POSITIONER IV ARMBOARD FOAM

## (undated) DEVICE — PAD ELECTRODE STER 1.5X3

## (undated) DEVICE — GLOVE SURGEON SYN PF SZ 9

## (undated) DEVICE — BLADE SHAVER PREBENT 4.2MM

## (undated) DEVICE — UNDERGLOVES BIOGEL PI SIZE 7.5

## (undated) DEVICE — GLOVE BIOGEL SKINSENSE PI 7.0